# Patient Record
Sex: MALE | Race: WHITE | NOT HISPANIC OR LATINO | Employment: FULL TIME | URBAN - NONMETROPOLITAN AREA
[De-identification: names, ages, dates, MRNs, and addresses within clinical notes are randomized per-mention and may not be internally consistent; named-entity substitution may affect disease eponyms.]

---

## 2023-02-20 PROBLEM — E66.01 MORBID OBESITY WITH BMI OF 40.0-44.9, ADULT (MULTI): Status: ACTIVE | Noted: 2023-02-20

## 2023-02-20 PROBLEM — G44.86 CERVICOGENIC HEADACHE: Status: ACTIVE | Noted: 2023-02-20

## 2023-02-20 PROBLEM — E87.1 CHRONIC HYPONATREMIA: Status: ACTIVE | Noted: 2023-02-20

## 2023-02-20 PROBLEM — M19.041 OSTEOARTHRITIS OF FINGER OF RIGHT HAND: Status: ACTIVE | Noted: 2023-02-20

## 2023-02-20 PROBLEM — E11.65 TYPE 2 DIABETES MELLITUS WITH HYPERGLYCEMIA, WITHOUT LONG-TERM CURRENT USE OF INSULIN (MULTI): Status: ACTIVE | Noted: 2023-02-20

## 2023-02-20 PROBLEM — M16.12 PRIMARY OSTEOARTHRITIS OF LEFT HIP: Status: ACTIVE | Noted: 2023-02-20

## 2023-02-20 PROBLEM — G47.30 SLEEP APNEA: Status: ACTIVE | Noted: 2023-02-20

## 2023-02-20 PROBLEM — J30.2 SEASONAL ALLERGIES: Status: ACTIVE | Noted: 2023-02-20

## 2023-02-20 PROBLEM — I89.0 LYMPHEDEMA: Status: ACTIVE | Noted: 2023-02-20

## 2023-02-20 PROBLEM — E78.00 HYPERCHOLESTEROLEMIA: Status: ACTIVE | Noted: 2023-02-20

## 2023-02-20 PROBLEM — E11.9 TYPE 2 DIABETES MELLITUS (MULTI): Status: ACTIVE | Noted: 2023-02-20

## 2023-02-20 PROBLEM — G57.02 PIRIFORMIS SYNDROME OF LEFT SIDE: Status: ACTIVE | Noted: 2023-02-20

## 2023-02-20 PROBLEM — I10 HYPERTENSION: Status: ACTIVE | Noted: 2023-02-20

## 2023-02-20 PROBLEM — J31.0 SEVERE CHRONIC RHINITIS: Status: ACTIVE | Noted: 2023-02-20

## 2023-02-20 PROBLEM — R91.1 LUNG NODULE, SOLITARY: Status: ACTIVE | Noted: 2023-02-20

## 2023-02-20 PROBLEM — D17.0 LIPOMA OF SCALP: Status: ACTIVE | Noted: 2023-02-20

## 2023-02-20 PROBLEM — D23.5: Status: ACTIVE | Noted: 2023-02-20

## 2023-02-20 PROBLEM — R13.10 DYSPHAGIA: Status: ACTIVE | Noted: 2023-02-20

## 2023-02-20 PROBLEM — N52.9 IMPOTENCE OF ORGANIC ORIGIN: Status: ACTIVE | Noted: 2023-02-20

## 2023-02-20 PROBLEM — G54.0 VASCULAR THORACIC OUTLET SYNDROME: Status: ACTIVE | Noted: 2023-02-20

## 2023-02-20 RX ORDER — METFORMIN HYDROCHLORIDE 500 MG/1
2 TABLET, EXTENDED RELEASE ORAL 2 TIMES DAILY
COMMUNITY
Start: 2019-12-04

## 2023-02-20 RX ORDER — NALOXONE HYDROCHLORIDE 4 MG/.1ML
SPRAY NASAL AS NEEDED
COMMUNITY
Start: 2020-07-16

## 2023-02-20 RX ORDER — IBUPROFEN 200 MG
CAPSULE ORAL 3 TIMES DAILY
COMMUNITY

## 2023-02-20 RX ORDER — FAMOTIDINE 20 MG/1
1 TABLET, FILM COATED ORAL 2 TIMES DAILY
COMMUNITY
End: 2023-03-24 | Stop reason: ALTCHOICE

## 2023-02-20 RX ORDER — METOPROLOL SUCCINATE 100 MG/1
1 TABLET, EXTENDED RELEASE ORAL
COMMUNITY
End: 2023-10-04

## 2023-02-20 RX ORDER — LANCETS
EACH MISCELLANEOUS 3 TIMES DAILY
COMMUNITY

## 2023-02-20 RX ORDER — SILDENAFIL 100 MG/1
1 TABLET, FILM COATED ORAL DAILY PRN
COMMUNITY
End: 2024-06-03 | Stop reason: SDUPTHER

## 2023-02-20 RX ORDER — LOSARTAN POTASSIUM 50 MG/1
1 TABLET ORAL DAILY
COMMUNITY
Start: 2022-10-26 | End: 2023-03-24 | Stop reason: ALTCHOICE

## 2023-02-20 RX ORDER — SEMAGLUTIDE 1.34 MG/ML
1 INJECTION, SOLUTION SUBCUTANEOUS
COMMUNITY
Start: 2022-10-07 | End: 2023-03-24 | Stop reason: ALTCHOICE

## 2023-02-20 RX ORDER — HYDROCODONE BITARTRATE AND ACETAMINOPHEN 10; 325 MG/1; MG/1
1 TABLET ORAL EVERY 6 HOURS
COMMUNITY
End: 2023-03-16 | Stop reason: SDUPTHER

## 2023-02-20 RX ORDER — ISOPROPYL ALCOHOL 70 ML/100ML
SWAB TOPICAL
COMMUNITY

## 2023-03-16 DIAGNOSIS — G44.86 CERVICOGENIC HEADACHE: ICD-10-CM

## 2023-03-16 RX ORDER — HYDROCODONE BITARTRATE AND ACETAMINOPHEN 10; 325 MG/1; MG/1
1 TABLET ORAL EVERY 6 HOURS
Qty: 120 TABLET | Refills: 0 | Status: SHIPPED | OUTPATIENT
Start: 2023-03-18 | End: 2023-04-14 | Stop reason: SDUPTHER

## 2023-03-17 ENCOUNTER — APPOINTMENT (OUTPATIENT)
Dept: PRIMARY CARE | Facility: CLINIC | Age: 58
End: 2023-03-17
Payer: COMMERCIAL

## 2023-03-24 ENCOUNTER — OFFICE VISIT (OUTPATIENT)
Dept: PRIMARY CARE | Facility: CLINIC | Age: 58
End: 2023-03-24
Payer: COMMERCIAL

## 2023-03-24 VITALS
HEIGHT: 74 IN | BODY MASS INDEX: 40.43 KG/M2 | HEART RATE: 89 BPM | SYSTOLIC BLOOD PRESSURE: 130 MMHG | DIASTOLIC BLOOD PRESSURE: 88 MMHG | WEIGHT: 315 LBS | OXYGEN SATURATION: 95 %

## 2023-03-24 DIAGNOSIS — Z79.4 TYPE 2 DIABETES MELLITUS WITH OTHER SPECIFIED COMPLICATION, WITH LONG-TERM CURRENT USE OF INSULIN (MULTI): ICD-10-CM

## 2023-03-24 DIAGNOSIS — E11.69 TYPE 2 DIABETES MELLITUS WITH OTHER SPECIFIED COMPLICATION, WITH LONG-TERM CURRENT USE OF INSULIN (MULTI): ICD-10-CM

## 2023-03-24 DIAGNOSIS — E66.01 MORBID OBESITY WITH BMI OF 40.0-44.9, ADULT (MULTI): ICD-10-CM

## 2023-03-24 DIAGNOSIS — R91.1 LUNG NODULE, SOLITARY: ICD-10-CM

## 2023-03-24 DIAGNOSIS — G47.33 OBSTRUCTIVE SLEEP APNEA SYNDROME: ICD-10-CM

## 2023-03-24 DIAGNOSIS — E11.65 TYPE 2 DIABETES MELLITUS WITH HYPERGLYCEMIA, WITHOUT LONG-TERM CURRENT USE OF INSULIN (MULTI): Primary | ICD-10-CM

## 2023-03-24 DIAGNOSIS — E78.00 HYPERCHOLESTEROLEMIA: ICD-10-CM

## 2023-03-24 DIAGNOSIS — G44.86 CERVICOGENIC HEADACHE: ICD-10-CM

## 2023-03-24 DIAGNOSIS — R13.10 DYSPHAGIA, UNSPECIFIED TYPE: ICD-10-CM

## 2023-03-24 DIAGNOSIS — I10 PRIMARY HYPERTENSION: ICD-10-CM

## 2023-03-24 PROBLEM — E87.1 CHRONIC HYPONATREMIA: Status: RESOLVED | Noted: 2023-02-20 | Resolved: 2023-03-24

## 2023-03-24 PROBLEM — J30.2 SEASONAL ALLERGIES: Status: RESOLVED | Noted: 2023-02-20 | Resolved: 2023-03-24

## 2023-03-24 PROBLEM — I89.0 LYMPHEDEMA: Status: RESOLVED | Noted: 2023-02-20 | Resolved: 2023-03-24

## 2023-03-24 PROCEDURE — 1036F TOBACCO NON-USER: CPT | Performed by: FAMILY MEDICINE

## 2023-03-24 PROCEDURE — 99214 OFFICE O/P EST MOD 30 MIN: CPT | Performed by: FAMILY MEDICINE

## 2023-03-24 PROCEDURE — 3075F SYST BP GE 130 - 139MM HG: CPT | Performed by: FAMILY MEDICINE

## 2023-03-24 PROCEDURE — 3079F DIAST BP 80-89 MM HG: CPT | Performed by: FAMILY MEDICINE

## 2023-03-24 PROCEDURE — 3008F BODY MASS INDEX DOCD: CPT | Performed by: FAMILY MEDICINE

## 2023-03-24 RX ORDER — BLOOD SUGAR DIAGNOSTIC
1 STRIP MISCELLANEOUS 2 TIMES DAILY
Qty: 180 EACH | Refills: 3 | Status: SHIPPED | OUTPATIENT
Start: 2023-03-24 | End: 2024-03-23

## 2023-03-24 ASSESSMENT — PATIENT HEALTH QUESTIONNAIRE - PHQ9
SUM OF ALL RESPONSES TO PHQ9 QUESTIONS 1 AND 2: 0
1. LITTLE INTEREST OR PLEASURE IN DOING THINGS: NOT AT ALL
2. FEELING DOWN, DEPRESSED OR HOPELESS: NOT AT ALL

## 2023-03-24 NOTE — PROGRESS NOTES
"Subjective   Patient ID: Vince Castillo is a 57 y.o. male who presents for Follow-up (Follow up on blood sugars, had covid last week).    HPI   Covid a week ago. Diarrhea and then URI but no chest congestion. Went to [facility]. Was put on Zpak. And fine now. Still some PND and mucus     Has a lung nodule and had a PET for a couple of nodules.  Mild uptake in one of them and in a small pleural effusion.  Dr Clark is following and will do another CT in 3 months and then 6 months for size.     Diabetes has been on Ozempic but could not get that Now on Rybelsus 14.  Ozempic is now back and up to 1 mg.  This has only been for a week.  The blood sugar is in the upper 100s.  Was over 300 and occasionally in mid 100s. All that started with an infection.  Was on Humilin after in hospital but not taking.  Will up the Ozempic 2 mg and then we will check A1C in 3 months.  No Chest pain, Dyspnea, palpitations, numbness, weakness, claudications, or double vision/ loss of vision.  No low sugars.  Was in hospital in December with Pneumonia and gets a gasp.  Weight is up some.. BMI 42    Chronic neck pain as before and in shoulder and hip  Pain 5-9 with drop of 2 with meds.,  3-4 hours.  Able to sleep with this and do his work. Would like to go on long walks. Has had PT and injections in shoulder but not neck.  Has not been to pain management.  Will address next visit when sugars controlled hopefully    Sleep apnea - cannot tolerate CPAP     HTN doing well on current meds     Dysphagia has controlled with diet changes     UDS 7/17/20 - as expected for medication profile 9/8/21 as expected for medication profile 10/7/22 as expected for medication profile         CSA 10/7/22  ORT 4/24/20 score 0  PSA 7/1/22            Review of Systems    Objective   /88 (BP Location: Left arm, Patient Position: Sitting)   Pulse 89   Ht 1.873 m (6' 1.75\")   Wt (!) 151 kg (332 lb)   SpO2 95%   BMI 42.92 kg/m²     Physical Exam  Vitals " reviewed.   Constitutional:       General: He is not in acute distress.     Appearance: Normal appearance. He is obese.   HENT:      Head: Normocephalic.      Right Ear: Tympanic membrane normal.      Left Ear: Tympanic membrane normal.      Nose: Nose normal.      Mouth/Throat:      Pharynx: Oropharynx is clear. No oropharyngeal exudate or posterior oropharyngeal erythema.      Comments: No uvula   Eyes:      Extraocular Movements: Extraocular movements intact.      Conjunctiva/sclera: Conjunctivae normal.      Pupils: Pupils are equal, round, and reactive to light.   Neck:      Vascular: No carotid bruit.   Cardiovascular:      Rate and Rhythm: Normal rate and regular rhythm.      Pulses:           Dorsalis pedis pulses are 3+ on the right side and 3+ on the left side.      Heart sounds: Normal heart sounds. No murmur heard.  Pulmonary:      Effort: Pulmonary effort is normal. No respiratory distress.      Breath sounds: Normal breath sounds.   Abdominal:      General: Abdomen is flat. Bowel sounds are normal. There is no distension.      Palpations: Abdomen is soft. There is no mass.      Tenderness: There is no abdominal tenderness.   Musculoskeletal:      Cervical back: Neck supple. Tenderness present. Decreased range of motion (with rotation , tip and extension).   Lymphadenopathy:      Cervical: No cervical adenopathy.   Skin:     General: Skin is warm and dry.      Findings: No rash.   Neurological:      General: No focal deficit present.      Mental Status: He is alert and oriented to person, place, and time.   Psychiatric:         Mood and Affect: Mood normal.         Thought Content: Thought content normal.         Judgment: Judgment normal.         Assessment/Plan   Problem List Items Addressed This Visit       Cervicogenic headache    Dysphagia    Hypercholesterolemia    Hypertension    Lung nodule, solitary    Morbid obesity with BMI of 40.0-44.9, adult (CMS/[facility])    Sleep apnea    Type 2 diabetes  "mellitus with hyperglycemia, without long-term current use of insulin (CMS/[facility]) - Primary    Relevant Medications    pen needle, diabetic ([employer]ips) 32 gauge x 1/4\" needle    semaglutide 2 mg/dose (8 mg/3 mL) pen injector    Other Relevant Orders    Comprehensive Metabolic Panel    Hemoglobin A1C    Lipid Panel    Albumin , Urine Random    Vitamin B12    Follow Up In Primary Care          "

## 2023-04-14 DIAGNOSIS — G44.86 CERVICOGENIC HEADACHE: ICD-10-CM

## 2023-04-14 RX ORDER — HYDROCODONE BITARTRATE AND ACETAMINOPHEN 10; 325 MG/1; MG/1
1 TABLET ORAL EVERY 6 HOURS
Qty: 120 TABLET | Refills: 0 | Status: SHIPPED | OUTPATIENT
Start: 2023-04-14 | End: 2023-05-12 | Stop reason: SDUPTHER

## 2023-05-12 DIAGNOSIS — G44.86 CERVICOGENIC HEADACHE: ICD-10-CM

## 2023-05-12 RX ORDER — HYDROCODONE BITARTRATE AND ACETAMINOPHEN 10; 325 MG/1; MG/1
1 TABLET ORAL EVERY 6 HOURS
Qty: 120 TABLET | Refills: 0 | Status: SHIPPED | OUTPATIENT
Start: 2023-05-12 | End: 2023-06-09 | Stop reason: SDUPTHER

## 2023-06-09 DIAGNOSIS — G44.86 CERVICOGENIC HEADACHE: ICD-10-CM

## 2023-06-09 RX ORDER — HYDROCODONE BITARTRATE AND ACETAMINOPHEN 10; 325 MG/1; MG/1
1 TABLET ORAL EVERY 6 HOURS
Qty: 120 TABLET | Refills: 0 | Status: SHIPPED | OUTPATIENT
Start: 2023-06-09 | End: 2023-07-07 | Stop reason: SDUPTHER

## 2023-06-21 PROBLEM — M19.011 ARTHRITIS OF BOTH ACROMIOCLAVICULAR JOINTS: Status: ACTIVE | Noted: 2021-09-20

## 2023-06-21 PROBLEM — J90 PLEURAL EFFUSION: Status: ACTIVE | Noted: 2022-12-07

## 2023-06-21 PROBLEM — M19.012 ARTHRITIS OF BOTH ACROMIOCLAVICULAR JOINTS: Status: ACTIVE | Noted: 2021-09-20

## 2023-06-27 DIAGNOSIS — E11.65 TYPE 2 DIABETES MELLITUS WITH HYPERGLYCEMIA, WITHOUT LONG-TERM CURRENT USE OF INSULIN (MULTI): Primary | ICD-10-CM

## 2023-06-29 RX ORDER — HYDROCHLOROTHIAZIDE 12.5 MG/1
1 CAPSULE ORAL DAILY
COMMUNITY
Start: 2022-11-30 | End: 2023-06-30 | Stop reason: ALTCHOICE

## 2023-06-29 RX ORDER — MELOXICAM 15 MG/1
1 TABLET ORAL DAILY
COMMUNITY
Start: 2022-09-17 | End: 2023-10-06 | Stop reason: ALTCHOICE

## 2023-06-29 RX ORDER — CHLORTHALIDONE 25 MG/1
25 TABLET ORAL DAILY
COMMUNITY
Start: 2023-06-22 | End: 2023-08-14 | Stop reason: ALTCHOICE

## 2023-06-29 RX ORDER — AMLODIPINE BESYLATE 5 MG/1
5 TABLET ORAL DAILY
COMMUNITY
Start: 2023-06-22 | End: 2024-04-05 | Stop reason: SDUPTHER

## 2023-06-30 ENCOUNTER — OFFICE VISIT (OUTPATIENT)
Dept: PRIMARY CARE | Facility: CLINIC | Age: 58
End: 2023-06-30
Payer: COMMERCIAL

## 2023-06-30 VITALS
OXYGEN SATURATION: 96 % | HEART RATE: 102 BPM | WEIGHT: 315 LBS | DIASTOLIC BLOOD PRESSURE: 98 MMHG | BODY MASS INDEX: 43.17 KG/M2 | SYSTOLIC BLOOD PRESSURE: 140 MMHG

## 2023-06-30 DIAGNOSIS — E78.00 HYPERCHOLESTEROLEMIA: ICD-10-CM

## 2023-06-30 DIAGNOSIS — J31.0 SEVERE CHRONIC RHINITIS: ICD-10-CM

## 2023-06-30 DIAGNOSIS — G47.33 OBSTRUCTIVE SLEEP APNEA SYNDROME: ICD-10-CM

## 2023-06-30 DIAGNOSIS — E11.65 TYPE 2 DIABETES MELLITUS WITH HYPERGLYCEMIA, WITHOUT LONG-TERM CURRENT USE OF INSULIN (MULTI): ICD-10-CM

## 2023-06-30 DIAGNOSIS — R13.10 DYSPHAGIA, UNSPECIFIED TYPE: ICD-10-CM

## 2023-06-30 DIAGNOSIS — E66.01 MORBID OBESITY WITH BMI OF 40.0-44.9, ADULT (MULTI): ICD-10-CM

## 2023-06-30 DIAGNOSIS — I10 PRIMARY HYPERTENSION: ICD-10-CM

## 2023-06-30 DIAGNOSIS — R91.1 LUNG NODULE, SOLITARY: ICD-10-CM

## 2023-06-30 DIAGNOSIS — M16.12 PRIMARY OSTEOARTHRITIS OF LEFT HIP: ICD-10-CM

## 2023-06-30 DIAGNOSIS — G44.86 CERVICOGENIC HEADACHE: Primary | ICD-10-CM

## 2023-06-30 DIAGNOSIS — D17.0 LIPOMA OF SCALP: ICD-10-CM

## 2023-06-30 PROBLEM — M19.041 OSTEOARTHRITIS OF FINGER OF RIGHT HAND: Status: RESOLVED | Noted: 2023-02-20 | Resolved: 2023-06-30

## 2023-06-30 PROBLEM — G57.02 PIRIFORMIS SYNDROME OF LEFT SIDE: Status: RESOLVED | Noted: 2023-02-20 | Resolved: 2023-06-30

## 2023-06-30 PROBLEM — J90 PLEURAL EFFUSION: Status: RESOLVED | Noted: 2022-12-07 | Resolved: 2023-06-30

## 2023-06-30 PROCEDURE — 3008F BODY MASS INDEX DOCD: CPT | Performed by: FAMILY MEDICINE

## 2023-06-30 PROCEDURE — 1036F TOBACCO NON-USER: CPT | Performed by: FAMILY MEDICINE

## 2023-06-30 PROCEDURE — 3077F SYST BP >= 140 MM HG: CPT | Performed by: FAMILY MEDICINE

## 2023-06-30 PROCEDURE — 99214 OFFICE O/P EST MOD 30 MIN: CPT | Performed by: FAMILY MEDICINE

## 2023-06-30 PROCEDURE — 3080F DIAST BP >= 90 MM HG: CPT | Performed by: FAMILY MEDICINE

## 2023-06-30 NOTE — PROGRESS NOTES
Subjective   Patient ID: Vince Castillo is a 57 y.o. male who presents for Med Management.    HPI  Has a lung nodule and had a PET for a couple of nodules.  Mild uptake in one of them and in a small pleural effusion.  Dr Clark is following and will do another in a year. The nodule did not show on the CT he just had .     Chronic Rhinitis - some yers gets seasonal shot. Quit smoking and thinks may have helped     Lipoma not changed on scalp      Diabetes had been on Ozempic but could not get that. Then back on Rybelsus 14.  And then back on Ozempic  and now to try Trulicity since covered by insurance.  The blood sugar is in the upper 100s. Into 200s.   Will check A1C in 3 months.  No Chest pain, Dyspnea, palpitations, numbness, weakness, claudications, or double vision/ loss of vision.  No low sugars.  Does gasp at times.  Weight is up some.. BMI 43     Chronic neck pain as before and in shoulder and hip  Pain 5-9 with drop of 4 with meds.,  3-4 hours.  Able to sleep with this and do his work. Would like to go on long walks. Has had PT and injections in shoulder but not neck.  Has not been to pain management.  Will address next visit when sugars controlled hopefully.  The hip also is painful and cannot get treated due to high A1C.      Sleep apnea - cannot tolerate CPAP      HTN doing well on current meds. Dr Meyers started Amlodipine and did started tapering Metoprolol. But BP up today.  As is heart rate.   Has been more alert off of the Metoprolol ER.  Chlorthalidone as well.  Needs to go back on half pill for now.  And if 130s/80s for week can go to every other day.  Drop off in a month BP and Pulse      Dysphagia has controlled with diet changes     Hyperlipidemia - will check next time     UDS 7/17/20 - as expected for medication profile 9/8/21 as expected for medication profile 10/7/22 as expected for medication profile         CSA 10/7/22  ORT 4/24/20 score 0  PSA 7/1/22    Review of Systems    Objective   BP  (!) 140/98 (BP Location: Left arm, Patient Position: Sitting)   Pulse 102   Wt (!) 152 kg (334 lb)   SpO2 96%   BMI 43.17 kg/m²     Physical Exam  Vitals reviewed.   Constitutional:       Appearance: Normal appearance.   HENT:      Head: Normocephalic.   Eyes:      Extraocular Movements: Extraocular movements intact.      Conjunctiva/sclera: Conjunctivae normal.      Pupils: Pupils are equal, round, and reactive to light.   Neck:      Vascular: No carotid bruit.   Cardiovascular:      Rate and Rhythm: Normal rate and regular rhythm.      Heart sounds: No murmur heard.  Pulmonary:      Effort: Pulmonary effort is normal.      Breath sounds: Normal breath sounds.   Abdominal:      General: Abdomen is flat. Bowel sounds are normal.      Palpations: Abdomen is soft.   Musculoskeletal:      Cervical back: Normal range of motion. Rigidity (minimal tipping to sides.Good rotation. Tight muscles) present. No tenderness.      Comments: No IR of left hip.    Lymphadenopathy:      Cervical: No cervical adenopathy.   Skin:     General: Skin is warm and dry.   Neurological:      General: No focal deficit present.      Mental Status: He is alert and oriented to person, place, and time.   Psychiatric:         Mood and Affect: Mood normal.         Behavior: Behavior normal.         Thought Content: Thought content normal.         Judgment: Judgment normal.         Assessment/Plan   Problem List Items Addressed This Visit       Cervicogenic headache - Primary    Dysphagia    Hypercholesterolemia    Hypertension    Lipoma of scalp    Lung nodule, solitary    Morbid obesity with BMI of 40.0-44.9, adult (CMS/HCC)    Primary osteoarthritis of left hip    Severe chronic rhinitis    Sleep apnea    Type 2 diabetes mellitus with hyperglycemia, without long-term current use of insulin (CMS/[facility])    Relevant Orders    Follow Up In Primary Care - Established

## 2023-07-07 DIAGNOSIS — G44.86 CERVICOGENIC HEADACHE: ICD-10-CM

## 2023-07-07 RX ORDER — HYDROCODONE BITARTRATE AND ACETAMINOPHEN 10; 325 MG/1; MG/1
1 TABLET ORAL EVERY 6 HOURS
Qty: 120 TABLET | Refills: 0 | Status: SHIPPED | OUTPATIENT
Start: 2023-07-09 | End: 2023-08-07 | Stop reason: SDUPTHER

## 2023-08-07 DIAGNOSIS — G44.86 CERVICOGENIC HEADACHE: ICD-10-CM

## 2023-08-07 RX ORDER — HYDROCODONE BITARTRATE AND ACETAMINOPHEN 10; 325 MG/1; MG/1
1 TABLET ORAL EVERY 6 HOURS
Qty: 120 TABLET | Refills: 0 | Status: SHIPPED | OUTPATIENT
Start: 2023-08-07 | End: 2023-09-05 | Stop reason: SDUPTHER

## 2023-08-14 PROBLEM — R10.9 ABDOMINAL PAIN: Status: ACTIVE | Noted: 2023-08-11

## 2023-08-14 RX ORDER — FENOFIBRATE 160 MG/1
160 TABLET ORAL DAILY
COMMUNITY
Start: 2023-08-13 | End: 2024-01-05 | Stop reason: ALTCHOICE

## 2023-08-14 RX ORDER — INSULIN GLARGINE 100 [IU]/ML
50 INJECTION, SOLUTION SUBCUTANEOUS NIGHTLY
COMMUNITY
Start: 2023-08-12 | End: 2024-01-05 | Stop reason: DRUGHIGH

## 2023-08-14 RX ORDER — INSULIN LISPRO 100 [IU]/ML
30-45 INJECTION, SOLUTION INTRAVENOUS; SUBCUTANEOUS
COMMUNITY
Start: 2023-08-12 | End: 2024-04-05 | Stop reason: SDUPTHER

## 2023-08-16 DIAGNOSIS — G47.33 OBSTRUCTIVE SLEEP APNEA SYNDROME: Primary | ICD-10-CM

## 2023-08-28 DIAGNOSIS — G47.33 OBSTRUCTIVE SLEEP APNEA SYNDROME: ICD-10-CM

## 2023-09-05 DIAGNOSIS — G47.33 OBSTRUCTIVE SLEEP APNEA SYNDROME: Primary | ICD-10-CM

## 2023-09-05 DIAGNOSIS — G44.86 CERVICOGENIC HEADACHE: ICD-10-CM

## 2023-09-05 RX ORDER — HYDROCODONE BITARTRATE AND ACETAMINOPHEN 10; 325 MG/1; MG/1
1 TABLET ORAL EVERY 6 HOURS
Qty: 120 TABLET | Refills: 0 | Status: SHIPPED | OUTPATIENT
Start: 2023-09-05 | End: 2023-09-05 | Stop reason: SDUPTHER

## 2023-09-05 RX ORDER — HYDROCODONE BITARTRATE AND ACETAMINOPHEN 10; 325 MG/1; MG/1
1 TABLET ORAL EVERY 6 HOURS
Qty: 120 TABLET | Refills: 0 | Status: SHIPPED | OUTPATIENT
Start: 2023-09-05 | End: 2023-10-04 | Stop reason: SDUPTHER

## 2023-09-19 DIAGNOSIS — G47.33 OBSTRUCTIVE SLEEP APNEA SYNDROME: Primary | ICD-10-CM

## 2023-10-04 ENCOUNTER — OFFICE VISIT (OUTPATIENT)
Dept: PRIMARY CARE | Facility: CLINIC | Age: 58
End: 2023-10-04
Payer: COMMERCIAL

## 2023-10-04 VITALS
SYSTOLIC BLOOD PRESSURE: 150 MMHG | BODY MASS INDEX: 40.43 KG/M2 | HEIGHT: 74 IN | WEIGHT: 315 LBS | HEART RATE: 97 BPM | OXYGEN SATURATION: 97 % | DIASTOLIC BLOOD PRESSURE: 84 MMHG

## 2023-10-04 DIAGNOSIS — G47.33 OBSTRUCTIVE SLEEP APNEA SYNDROME: ICD-10-CM

## 2023-10-04 DIAGNOSIS — G44.86 CERVICOGENIC HEADACHE: ICD-10-CM

## 2023-10-04 PROCEDURE — 3008F BODY MASS INDEX DOCD: CPT | Performed by: STUDENT IN AN ORGANIZED HEALTH CARE EDUCATION/TRAINING PROGRAM

## 2023-10-04 PROCEDURE — 1036F TOBACCO NON-USER: CPT | Performed by: STUDENT IN AN ORGANIZED HEALTH CARE EDUCATION/TRAINING PROGRAM

## 2023-10-04 PROCEDURE — 3077F SYST BP >= 140 MM HG: CPT | Performed by: STUDENT IN AN ORGANIZED HEALTH CARE EDUCATION/TRAINING PROGRAM

## 2023-10-04 PROCEDURE — 3079F DIAST BP 80-89 MM HG: CPT | Performed by: STUDENT IN AN ORGANIZED HEALTH CARE EDUCATION/TRAINING PROGRAM

## 2023-10-04 PROCEDURE — 99214 OFFICE O/P EST MOD 30 MIN: CPT | Performed by: STUDENT IN AN ORGANIZED HEALTH CARE EDUCATION/TRAINING PROGRAM

## 2023-10-04 RX ORDER — SPIRONOLACTONE 25 MG/1
25 TABLET ORAL
COMMUNITY
Start: 2023-09-07 | End: 2023-10-06 | Stop reason: SDUPTHER

## 2023-10-04 RX ORDER — HYDROCODONE BITARTRATE AND ACETAMINOPHEN 10; 325 MG/1; MG/1
1 TABLET ORAL EVERY 6 HOURS
Qty: 120 TABLET | Refills: 0 | Status: SHIPPED | OUTPATIENT
Start: 2023-10-04 | End: 2023-11-01 | Stop reason: SDUPTHER

## 2023-10-04 NOTE — PROGRESS NOTES
"Sleep Medicine Consultation Note    Chief Complaint   Patient presents with    pt here to f/u on sleep study results      HPI: Mr. Castillo seen at the request of Dr. Hernandez for advice regarding suspected obstructive sleep apnea.     Patient reports that he was diagnosed with obstructive sleep apnea many years ago.  Has tried CPAP for a few days and could not tolerate it.  So he stopped using CPAP.  Lately he has been trying to get into better health.  His diabetes under poor control and his blood pressure is under poor control as well.  He would like to control sleep apnea to see if this helps his overall health.  Reports that he underwent a home sleep study which showed severe sleep apnea and eventually had an in lab sleep study for CPAP titration.  He was recommended to come to us to discuss the results given the complex nature of the results.    Snoring: yes   Severity: loud  Frequency: nightly    Daytime Symptoms:  Upon Awakening: Tired  Daytime fatigue/sleepiness: He has      ROS:  CON: weight change: see HPI  ENT: nasal obstruction: see HPI      PE:  General: calm and cooperative  Eyes: Conjunctival injection: no  EOM: intact  Eyelid hooding: no    Neuro: no tics or tremors noted. Gait stable  Psych  Alert and appropriate: yes  Mood: \"good\"  Affect: mood congruent    Assessment: Mr. Castillo who is seen for management of obstructive sleep apnea. The pathophysiology of, the reasons to treat and treatment options for obstructive sleep apnea were all reviewed with the patient today.  Reviewed his 2 sleep studies.  Home sleep study shows severe degree of KEL along with hypoxemia for over 90 minutes.  Titration study was started at 5 cm of CPAP pressure.  After reaching 15 cmH2O, PAP was transitioned to BiPAP.   After reviewing his study there is decent control of KEL without central sleep apnea and intolerance at pressure 16/12 cmH2O. REM stage of sleep was not observed at this pressure site is possible that there " could be inadequate control. Will start BiPAP with this pressure for now.  Recommended him to sleep on side. Discussed interventions to keep himself on his side when sleeping.  If his download information shows inadequate control of obstructive sleep apnea then will consider another in lab titration study.     Home care company is requested: [facility]    Recommendations:  1) Start BiPAP at 16/12 cmH2O.   2) Driving safety was reviewed with patient. If the patient feels too sleepy to drive he/she knows not to drive. If he/she becomes sleepy while driving he/she will pull over and nap.    The patient indicates understanding of these issues and agrees with the plan.    Follow up in 6-8 weeks.     Individually reviewed both HSAT and PAP titration study results.   More than 30 minutes was spent with patient to discuss results and plan. 5 minutes was spent in writing this note.

## 2023-10-05 RX ORDER — BLOOD-GLUCOSE METER
EACH MISCELLANEOUS
COMMUNITY
Start: 2023-08-13

## 2023-10-05 RX ORDER — FENOFIBRATE 145 MG/1
145 TABLET, FILM COATED ORAL DAILY
COMMUNITY
Start: 2023-09-14 | End: 2024-01-05 | Stop reason: ALTCHOICE

## 2023-10-06 ENCOUNTER — OFFICE VISIT (OUTPATIENT)
Dept: PRIMARY CARE | Facility: CLINIC | Age: 58
End: 2023-10-06
Payer: COMMERCIAL

## 2023-10-06 VITALS
HEART RATE: 92 BPM | OXYGEN SATURATION: 97 % | WEIGHT: 315 LBS | BODY MASS INDEX: 44.6 KG/M2 | DIASTOLIC BLOOD PRESSURE: 88 MMHG | SYSTOLIC BLOOD PRESSURE: 140 MMHG

## 2023-10-06 DIAGNOSIS — M16.12 PRIMARY OSTEOARTHRITIS OF LEFT HIP: ICD-10-CM

## 2023-10-06 DIAGNOSIS — D17.0 LIPOMA OF SCALP: ICD-10-CM

## 2023-10-06 DIAGNOSIS — E66.01 MORBID OBESITY WITH BMI OF 40.0-44.9, ADULT (MULTI): ICD-10-CM

## 2023-10-06 DIAGNOSIS — Z79.899 MEDICATION MANAGEMENT: Primary | ICD-10-CM

## 2023-10-06 DIAGNOSIS — G47.31 PRIMARY CENTRAL SLEEP APNEA: ICD-10-CM

## 2023-10-06 DIAGNOSIS — G44.86 CERVICOGENIC HEADACHE: ICD-10-CM

## 2023-10-06 DIAGNOSIS — E78.1 HYPERTRIGLYCERIDEMIA, FAMILIAL: ICD-10-CM

## 2023-10-06 DIAGNOSIS — R91.1 LUNG NODULE, SOLITARY: ICD-10-CM

## 2023-10-06 DIAGNOSIS — R13.10 DYSPHAGIA, UNSPECIFIED TYPE: ICD-10-CM

## 2023-10-06 DIAGNOSIS — E11.65 TYPE 2 DIABETES MELLITUS WITH HYPERGLYCEMIA, WITHOUT LONG-TERM CURRENT USE OF INSULIN (MULTI): ICD-10-CM

## 2023-10-06 DIAGNOSIS — J31.0 SEVERE CHRONIC RHINITIS: ICD-10-CM

## 2023-10-06 DIAGNOSIS — E78.00 HYPERCHOLESTEROLEMIA: ICD-10-CM

## 2023-10-06 DIAGNOSIS — I10 PRIMARY HYPERTENSION: ICD-10-CM

## 2023-10-06 PROBLEM — R10.9 ABDOMINAL PAIN: Status: RESOLVED | Noted: 2023-08-11 | Resolved: 2023-10-06

## 2023-10-06 PROCEDURE — 82570 ASSAY OF URINE CREATININE: CPT

## 2023-10-06 PROCEDURE — 80373 DRUG SCREENING TRAMADOL: CPT

## 2023-10-06 PROCEDURE — 3060F POS MICROALBUMINURIA REV: CPT | Performed by: FAMILY MEDICINE

## 2023-10-06 PROCEDURE — 80354 DRUG SCREENING FENTANYL: CPT

## 2023-10-06 PROCEDURE — 3077F SYST BP >= 140 MM HG: CPT | Performed by: FAMILY MEDICINE

## 2023-10-06 PROCEDURE — 3008F BODY MASS INDEX DOCD: CPT | Performed by: FAMILY MEDICINE

## 2023-10-06 PROCEDURE — 99215 OFFICE O/P EST HI 40 MIN: CPT | Performed by: FAMILY MEDICINE

## 2023-10-06 PROCEDURE — 80365 DRUG SCREENING OXYCODONE: CPT

## 2023-10-06 PROCEDURE — 80361 OPIATES 1 OR MORE: CPT

## 2023-10-06 PROCEDURE — 80358 DRUG SCREENING METHADONE: CPT

## 2023-10-06 PROCEDURE — 3079F DIAST BP 80-89 MM HG: CPT | Performed by: FAMILY MEDICINE

## 2023-10-06 PROCEDURE — 1036F TOBACCO NON-USER: CPT | Performed by: FAMILY MEDICINE

## 2023-10-06 PROCEDURE — 80346 BENZODIAZEPINES1-12: CPT

## 2023-10-06 PROCEDURE — 80307 DRUG TEST PRSMV CHEM ANLYZR: CPT

## 2023-10-06 PROCEDURE — 80368 SEDATIVE HYPNOTICS: CPT

## 2023-10-06 RX ORDER — SPIRONOLACTONE 50 MG/1
50 TABLET, FILM COATED ORAL DAILY
Qty: 90 TABLET | Refills: 3 | Status: SHIPPED | OUTPATIENT
Start: 2023-10-06 | End: 2024-05-02 | Stop reason: SDUPTHER

## 2023-10-06 NOTE — PROGRESS NOTES
Subjective   Patient ID: Vince Castillo is a 58 y.o. male who presents for Med Management (Had elevated blood sugar 2 mos ago,  elevated BP).    HPI   Has a lung nodule and had a PET for a couple of nodules.  Mild uptake in one of them and in a small pleural effusion.  Dr Clark is following and will do another next year.  Smoking  quit a year ago. Does have nodule on CT in June stable from January. Were 7 and 5 mm.      Chronic Rhinitis - some years gets seasonal shot. Quit smoking and thinks may have helped. Occasionally dysphagia. Chewing better and using water.        Lipoma not changed on scalp      Diabetes  got out of control a couple months ago so he went to [place] ER and admitted. Since then has been following with Dr Castle. Now on Insulin SS Humalog and Lantus. Feels a lot better. Could never get Ozempic or Rybelsus or Trulicity.  Had blurry vision that is better now.  Joints are now stiff.  Were swollen for 2 weeks after he was put on Insulin from knee down.  Wearing compression stockings and on spironolactone.  No Chest pain, Dyspnea, palpitations, numbness, weakness, claudications, or double vision/ loss of vision.  Weight is up some.. BMI 44.  A1C was 12.6.      Chronic neck pain as before and in shoulder and hip  Pain 4-10 with drop of 4 with meds.,  4-6 hours.  Able to sleep with this and do his work. Would like to go on long walks. Has had PT and injections in shoulder but not neck.  Has not been to pain management.  Will address next visit when sugars controlled hopefully.  Has been achier since he was on insulin. The hip also is painful and cannot get treated due to high A1C.      Sleep apnea - has been to Dr Alonzo and had in lab titration and to go with BiPap 12/8.  He is to get the machine yet. Has central      HTN doing well on current meds. Dr Meyers started Amlodipine and spironolactone.  For now will make no changes and hopefully can get the weight back down and get this down to 120s.   Fatigue on  Metoprolol ER.  Chlorthalidone was stopped due to polyuria which turns out to be due to the diabetes.  Will increase the spironolactone.     Dysphagia has controlled with diet changes      Hyperlipidemia - Very high TG when in hospital and on fenofibrate. .  On for 4 weeks. Will check in a week         UDS 20 - as expected for medication profile 21 as expected for medication profile 10/7/22 as expected for medication profile 10/6/23 as expected for medication profile         CSA 10/6/23  ORT 20 score 0  PSA 22    Review of Systems    Objective   /88 (BP Location: Right arm, Patient Position: Sitting)   Pulse 92   Wt (!) 156 kg (345 lb)   SpO2 97%   BMI 44.60 kg/m²     Physical Exam  Vitals reviewed.   Constitutional:       General: He is not in acute distress.     Appearance: Normal appearance. He is obese.   HENT:      Head: Normocephalic.      Right Ear: Tympanic membrane normal.      Left Ear: Tympanic membrane normal.      Nose: Nose normal.      Mouth/Throat:      Mouth: Mucous membranes are moist.      Pharynx: Oropharynx is clear.      Comments: No uvula  Eyes:      Extraocular Movements: Extraocular movements intact.      Conjunctiva/sclera: Conjunctivae normal.      Pupils: Pupils are equal, round, and reactive to light.   Neck:      Vascular: No carotid bruit.   Cardiovascular:      Rate and Rhythm: Normal rate and regular rhythm.      Pulses: Normal pulses.      Heart sounds: Normal heart sounds. No murmur heard.  Pulmonary:      Effort: Pulmonary effort is normal. No respiratory distress.      Breath sounds: Normal breath sounds.   Abdominal:      General: Abdomen is flat. Bowel sounds are normal. There is no distension.      Palpations: Abdomen is soft. There is no mass.      Tenderness: There is no abdominal tenderness.   Musculoskeletal:      Cervical back: Normal range of motion and neck supple. No tenderness.      Right lower le+ Pitting Edema  present.      Left lower le+ Pitting Edema present.   Lymphadenopathy:      Cervical: No cervical adenopathy.   Skin:     General: Skin is warm and dry.      Findings: No rash.   Neurological:      General: No focal deficit present.      Mental Status: He is alert and oriented to person, place, and time.   Psychiatric:         Mood and Affect: Mood normal.         Thought Content: Thought content normal.         Judgment: Judgment normal.         Assessment/Plan   Problem List Items Addressed This Visit             ICD-10-CM    Cervicogenic headache G44.86    Dysphagia R13.10    Hypercholesterolemia E78.00    Hypertension I10    Relevant Medications    spironolactone (Aldactone) 50 mg tablet    Lipoma of scalp D17.0    Lung nodule, solitary R91.1    Morbid obesity with BMI of 40.0-44.9, adult (CMS/[facility]) E66.01, Z68.41    Primary osteoarthritis of left hip M16.12    Severe chronic rhinitis J31.0    Sleep apnea G47.30    Type 2 diabetes mellitus with hyperglycemia, without long-term current use of insulin (CMS/HCC) E11.65    Relevant Orders    Basic Metabolic Panel    Follow Up In Primary Care - Established     Other Visit Diagnoses         Codes    Medication management    -  Primary Z79.899    Relevant Orders    Opiate/Opioid/Benzo Prescription Compliance    Hypertriglyceridemia, familial     E78.1    Relevant Orders    Triglycerides

## 2023-10-07 LAB
AMPHETAMINES UR QL SCN: NORMAL
BARBITURATES UR QL SCN: NORMAL
BZE UR QL SCN: NORMAL
CANNABINOIDS UR QL SCN: NORMAL
CREAT UR-MCNC: 86.2 MG/DL (ref 20–370)
PCP UR QL SCN: NORMAL

## 2023-10-19 ENCOUNTER — DOCUMENTATION (OUTPATIENT)
Dept: PRIMARY CARE | Facility: CLINIC | Age: 58
End: 2023-10-19
Payer: COMMERCIAL

## 2023-10-19 LAB
NONINV COLON CA DNA+OCC BLD SCRN STL QL: NEGATIVE
NONINV COLON CA DNA+OCC BLD SCRN STL-IMP: NEGATIVE

## 2023-11-01 DIAGNOSIS — G44.86 CERVICOGENIC HEADACHE: ICD-10-CM

## 2023-11-01 RX ORDER — HYDROCODONE BITARTRATE AND ACETAMINOPHEN 10; 325 MG/1; MG/1
1 TABLET ORAL EVERY 6 HOURS
Qty: 120 TABLET | Refills: 0 | Status: SHIPPED | OUTPATIENT
Start: 2023-11-03 | End: 2023-12-01 | Stop reason: SDUPTHER

## 2023-11-01 NOTE — TELEPHONE ENCOUNTER
Medical Service company called and was asking for the Base line sleep study, the one without treatment faxed over to them fax is [phone].   Call back number is [phone] option [phone]

## 2023-12-01 DIAGNOSIS — G44.86 CERVICOGENIC HEADACHE: ICD-10-CM

## 2023-12-01 RX ORDER — HYDROCODONE BITARTRATE AND ACETAMINOPHEN 10; 325 MG/1; MG/1
1 TABLET ORAL EVERY 6 HOURS
Qty: 120 TABLET | Refills: 0 | Status: SHIPPED | OUTPATIENT
Start: 2023-12-01 | End: 2023-12-30 | Stop reason: SDUPTHER

## 2023-12-05 ENCOUNTER — APPOINTMENT (OUTPATIENT)
Dept: PRIMARY CARE | Facility: CLINIC | Age: 58
End: 2023-12-05
Payer: COMMERCIAL

## 2023-12-30 DIAGNOSIS — G44.86 CERVICOGENIC HEADACHE: ICD-10-CM

## 2023-12-30 RX ORDER — HYDROCODONE BITARTRATE AND ACETAMINOPHEN 10; 325 MG/1; MG/1
1 TABLET ORAL EVERY 6 HOURS
Qty: 120 TABLET | Refills: 0 | Status: SHIPPED | OUTPATIENT
Start: 2023-12-30 | End: 2024-01-05 | Stop reason: SDUPTHER

## 2024-01-02 ENCOUNTER — LAB (OUTPATIENT)
Dept: LAB | Facility: LAB | Age: 59
End: 2024-01-02
Payer: COMMERCIAL

## 2024-01-02 DIAGNOSIS — E78.1 HYPERTRIGLYCERIDEMIA, FAMILIAL: ICD-10-CM

## 2024-01-02 DIAGNOSIS — E11.65 TYPE 2 DIABETES MELLITUS WITH HYPERGLYCEMIA, WITHOUT LONG-TERM CURRENT USE OF INSULIN (MULTI): ICD-10-CM

## 2024-01-02 LAB
ALBUMIN SERPL BCP-MCNC: 4.1 G/DL (ref 3.4–5)
ALP SERPL-CCNC: 69 U/L (ref 33–120)
ALT SERPL W P-5'-P-CCNC: 27 U/L (ref 10–52)
ANION GAP SERPL CALC-SCNC: 10 MMOL/L (ref 10–20)
AST SERPL W P-5'-P-CCNC: 20 U/L (ref 9–39)
BILIRUB SERPL-MCNC: 0.6 MG/DL (ref 0–1.2)
BUN SERPL-MCNC: 17 MG/DL (ref 6–23)
CALCIUM SERPL-MCNC: 9.3 MG/DL (ref 8.6–10.3)
CHLORIDE SERPL-SCNC: 100 MMOL/L (ref 98–107)
CHOLEST SERPL-MCNC: 213 MG/DL (ref 0–199)
CHOLESTEROL/HDL RATIO: 4.3
CO2 SERPL-SCNC: 28 MMOL/L (ref 21–32)
CREAT SERPL-MCNC: 0.76 MG/DL (ref 0.5–1.3)
CREAT UR-MCNC: 64 MG/DL (ref 20–370)
EST. AVERAGE GLUCOSE BLD GHB EST-MCNC: 171 MG/DL
GFR SERPL CREATININE-BSD FRML MDRD: >90 ML/MIN/1.73M*2
GLUCOSE SERPL-MCNC: 227 MG/DL (ref 74–99)
HBA1C MFR BLD: 7.6 %
HDLC SERPL-MCNC: 50 MG/DL
LDLC SERPL CALC-MCNC: 130 MG/DL
MICROALBUMIN UR-MCNC: <7 MG/L
MICROALBUMIN/CREAT UR: NORMAL MG/G{CREAT}
NON HDL CHOLESTEROL: 163 MG/DL (ref 0–149)
POTASSIUM SERPL-SCNC: 4.4 MMOL/L (ref 3.5–5.3)
PROT SERPL-MCNC: 6.9 G/DL (ref 6.4–8.2)
SODIUM SERPL-SCNC: 134 MMOL/L (ref 136–145)
TRIGL SERPL-MCNC: 163 MG/DL (ref 0–149)
VIT B12 SERPL-MCNC: 247 PG/ML (ref 211–911)
VLDL: 33 MG/DL (ref 0–40)

## 2024-01-02 PROCEDURE — 83036 HEMOGLOBIN GLYCOSYLATED A1C: CPT

## 2024-01-02 PROCEDURE — 80053 COMPREHEN METABOLIC PANEL: CPT

## 2024-01-02 PROCEDURE — 36415 COLL VENOUS BLD VENIPUNCTURE: CPT

## 2024-01-02 PROCEDURE — 82043 UR ALBUMIN QUANTITATIVE: CPT

## 2024-01-02 PROCEDURE — 82607 VITAMIN B-12: CPT

## 2024-01-02 PROCEDURE — 80061 LIPID PANEL: CPT

## 2024-01-02 PROCEDURE — 82570 ASSAY OF URINE CREATININE: CPT

## 2024-01-05 ENCOUNTER — OFFICE VISIT (OUTPATIENT)
Dept: PRIMARY CARE | Facility: CLINIC | Age: 59
End: 2024-01-05
Payer: COMMERCIAL

## 2024-01-05 VITALS
HEART RATE: 87 BPM | BODY MASS INDEX: 45.63 KG/M2 | OXYGEN SATURATION: 96 % | SYSTOLIC BLOOD PRESSURE: 138 MMHG | DIASTOLIC BLOOD PRESSURE: 82 MMHG | WEIGHT: 315 LBS

## 2024-01-05 DIAGNOSIS — Z00.00 HEALTHCARE MAINTENANCE: ICD-10-CM

## 2024-01-05 DIAGNOSIS — G47.31 PRIMARY CENTRAL SLEEP APNEA: ICD-10-CM

## 2024-01-05 DIAGNOSIS — E11.65 TYPE 2 DIABETES MELLITUS WITH HYPERGLYCEMIA, WITH LONG-TERM CURRENT USE OF INSULIN (MULTI): ICD-10-CM

## 2024-01-05 DIAGNOSIS — M16.12 PRIMARY OSTEOARTHRITIS OF LEFT HIP: ICD-10-CM

## 2024-01-05 DIAGNOSIS — I10 PRIMARY HYPERTENSION: ICD-10-CM

## 2024-01-05 DIAGNOSIS — Z79.4 TYPE 2 DIABETES MELLITUS WITH HYPERGLYCEMIA, WITH LONG-TERM CURRENT USE OF INSULIN (MULTI): ICD-10-CM

## 2024-01-05 DIAGNOSIS — G44.86 CERVICOGENIC HEADACHE: ICD-10-CM

## 2024-01-05 DIAGNOSIS — E66.01 MORBID OBESITY WITH BMI OF 45.0-49.9, ADULT (MULTI): ICD-10-CM

## 2024-01-05 DIAGNOSIS — R91.1 LUNG NODULE, SOLITARY: ICD-10-CM

## 2024-01-05 DIAGNOSIS — J31.0 SEVERE CHRONIC RHINITIS: Primary | ICD-10-CM

## 2024-01-05 PROBLEM — E11.69 TYPE 2 DIABETES MELLITUS WITH OTHER SPECIFIED COMPLICATION, WITH LONG-TERM CURRENT USE OF INSULIN (MULTI): Status: ACTIVE | Noted: 2023-02-20

## 2024-01-05 PROBLEM — E11.69 TYPE 2 DIABETES MELLITUS WITH OTHER SPECIFIED COMPLICATION, WITH LONG-TERM CURRENT USE OF INSULIN (MULTI): Status: RESOLVED | Noted: 2023-02-20 | Resolved: 2024-01-05

## 2024-01-05 LAB
1OH-MIDAZOLAM UR CFM-MCNC: <25 NG/ML
6MAM UR CFM-MCNC: <25 NG/ML
7AMINOCLONAZEPAM UR CFM-MCNC: <25 NG/ML
A-OH ALPRAZ UR CFM-MCNC: <25 NG/ML
ALPRAZ UR CFM-MCNC: <25 NG/ML
CHLORDIAZEP UR CFM-MCNC: <25 NG/ML
CLONAZEPAM UR CFM-MCNC: <25 NG/ML
CODEINE UR CFM-MCNC: <50 NG/ML
DIAZEPAM UR CFM-MCNC: <25 NG/ML
EDDP UR CFM-MCNC: <25 NG/ML
FENTANYL UR CFM-MCNC: <2.5 NG/ML
HYDROCODONE CTO UR CFM-MCNC: 758 NG/ML
HYDROMORPHONE UR CFM-MCNC: 139 NG/ML
LORAZEPAM UR CFM-MCNC: <25 NG/ML
METHADONE UR CFM-MCNC: <25 NG/ML
MIDAZOLAM UR CFM-MCNC: <25 NG/ML
MORPHINE UR CFM-MCNC: <50 NG/ML
NORDIAZEPAM UR CFM-MCNC: <25 NG/ML
NORFENTANYL UR CFM-MCNC: <2.5 NG/ML
NORHYDROCODONE UR CFM-MCNC: 541 NG/ML
NOROXYCODONE UR CFM-MCNC: <25 NG/ML
NORTRAMADOL UR-MCNC: <50 NG/ML
OXAZEPAM UR CFM-MCNC: <25 NG/ML
OXYCODONE UR CFM-MCNC: <25 NG/ML
OXYMORPHONE UR CFM-MCNC: <25 NG/ML
TEMAZEPAM UR CFM-MCNC: <25 NG/ML
TRAMADOL UR CFM-MCNC: <50 NG/ML
ZOLPIDEM UR CFM-MCNC: <25 NG/ML
ZOLPIDEM UR-MCNC: <25 NG/ML

## 2024-01-05 PROCEDURE — 99214 OFFICE O/P EST MOD 30 MIN: CPT | Performed by: FAMILY MEDICINE

## 2024-01-05 PROCEDURE — 3008F BODY MASS INDEX DOCD: CPT | Performed by: FAMILY MEDICINE

## 2024-01-05 PROCEDURE — 1036F TOBACCO NON-USER: CPT | Performed by: FAMILY MEDICINE

## 2024-01-05 PROCEDURE — 3079F DIAST BP 80-89 MM HG: CPT | Performed by: FAMILY MEDICINE

## 2024-01-05 PROCEDURE — 3050F LDL-C >= 130 MG/DL: CPT | Performed by: FAMILY MEDICINE

## 2024-01-05 PROCEDURE — 3062F POS MACROALBUMINURIA REV: CPT | Performed by: FAMILY MEDICINE

## 2024-01-05 PROCEDURE — 3075F SYST BP GE 130 - 139MM HG: CPT | Performed by: FAMILY MEDICINE

## 2024-01-05 PROCEDURE — 3051F HG A1C>EQUAL 7.0%<8.0%: CPT | Performed by: FAMILY MEDICINE

## 2024-01-05 RX ORDER — BLOOD-GLUCOSE SENSOR
1 EACH MISCELLANEOUS
COMMUNITY
Start: 2023-12-28 | End: 2024-04-05 | Stop reason: SDUPTHER

## 2024-01-05 RX ORDER — INSULIN GLARGINE 100 [IU]/ML
45 INJECTION, SOLUTION SUBCUTANEOUS NIGHTLY
Qty: 3 ML | Refills: 11 | Status: SHIPPED | OUTPATIENT
Start: 2024-01-05

## 2024-01-05 RX ORDER — HYDROCODONE BITARTRATE AND ACETAMINOPHEN 10; 325 MG/1; MG/1
1 TABLET ORAL EVERY 6 HOURS
Qty: 120 TABLET | Refills: 0 | Status: SHIPPED | OUTPATIENT
Start: 2024-01-05 | End: 2024-02-04

## 2024-01-05 RX ORDER — MELOXICAM 15 MG/1
15 TABLET ORAL DAILY
Qty: 30 TABLET | Refills: 11 | Status: SHIPPED | OUTPATIENT
Start: 2024-01-05 | End: 2025-01-04

## 2024-01-05 NOTE — PROGRESS NOTES
Subjective   Patient ID: Vince Castillo is a 58 y.o. male who presents for Med Management (Discuss weight gain and insulin).    HPI   Has a lung nodule and had a PET for a couple of nodules.  Mild uptake in one of them and in a small pleural effusion.  Dr Clark is following and will do another in June.  Smoking  quit a year ago. Does have nodule on CT in June 2023  stable from January. Were 7 and 5 mm.       Chronic Rhinitis - some years gets seasonal shot. Quit smoking and thinks may have helped. Occasionally dysphagia. Chewing better and using water.        Lipoma not changed on scalp      Diabetes  got out of control last year so he went to [place] ER and admitted. Since then has been following with Dr Castle. Now on Insulin SS Humalog and Lantus. Feels a lot better. Could never get Ozempic  or Rybelsus or Trulicity.  Gaining weight.  Had blurry vision that is better now.  Joints are now stiff and swollen in the hands after he was put on Insulin.  Wearing compression stockings and on spironolactone for swelling in legs.  Better in hands with Voltaren.  No Chest pain, Dyspnea, palpitations, numbness, weakness, claudications, or double vision/ loss of vision.  Weight is up again.  BMI 45.  A1C was 7.6 this time down from 12.6.  Will check to see if they can get GLP1 again..       Chronic neck pain as before and in shoulder and hip  Pain 5-10 with drop of 3 with meds.,  3-4 hours.  Seems rougher in Winter.  Able to sleep with this and do his work. Would like to go on long walks. Has had PT and injections in shoulder but not neck.  Has not been to pain management.  Has been achier since he was on insulin. The hip also is painful and now can  get treated with low A1C. Will try Mobic      Sleep apnea - has been to Dr Alonzo and had in lab titration and to go with BiPap 12/8.  He just got  the machine yet. Has central.      HTN doing well on current meds. Dr Meyers started Amlodipine and spironolactone.  For now will make  no changes and hopefully can get the weight back down and get this down to 120s.  Fatigue on Metoprolol ER.  Chlorthalidone was stopped due to polyuria which turns out to be due to the diabetes.       Dysphagia has controlled with diet changes      Hyperlipidemia - Much better TG even thought he stopped the fenofibrate. . .          UDS 7/17/20 - as expected for medication profile 9/8/21 as expected for medication profile 10/7/22 as expected for medication profile 10/6/23 as expected for medication profile         CSA 10/6/23  ORT 4/24/20 score 0  PSA 7/1/22    Review of Systems    Objective   /82 (BP Location: Left arm, Patient Position: Sitting)   Pulse 87   Wt (!) 160 kg (353 lb)   SpO2 96%   BMI 45.63 kg/m²     Physical Exam  Vitals reviewed.   Constitutional:       General: He is not in acute distress.     Appearance: Normal appearance.   HENT:      Head: Normocephalic.      Right Ear: Tympanic membrane, ear canal and external ear normal.      Left Ear: Tympanic membrane, ear canal and external ear normal.      Nose: Nose normal.      Mouth/Throat:      Mouth: Mucous membranes are moist.      Pharynx: Oropharynx is clear.      Comments: No uvula   Eyes:      Extraocular Movements: Extraocular movements intact.      Conjunctiva/sclera: Conjunctivae normal.      Pupils: Pupils are equal, round, and reactive to light.   Neck:      Vascular: No carotid bruit.   Cardiovascular:      Rate and Rhythm: Normal rate and regular rhythm.      Pulses: Normal pulses.      Heart sounds: Normal heart sounds. No murmur heard.  Pulmonary:      Effort: Pulmonary effort is normal. No respiratory distress.      Breath sounds: Normal breath sounds.   Abdominal:      General: Abdomen is flat. Bowel sounds are normal. There is no distension.      Palpations: Abdomen is soft. There is no mass.      Tenderness: There is no abdominal tenderness.   Musculoskeletal:         General: Tenderness (paracervical  paralumbar) present.      Cervical back: Normal range of motion and neck supple. No tenderness.      Comments: No motion of right hip  Limited rotation of neck    Lymphadenopathy:      Cervical: No cervical adenopathy.   Skin:     General: Skin is warm and dry.      Findings: No rash.   Neurological:      General: No focal deficit present.      Mental Status: He is alert and oriented to person, place, and time.   Psychiatric:         Mood and Affect: Mood normal.         Thought Content: Thought content normal.         Judgment: Judgment normal.         Assessment/Plan   Diagnoses and all orders for this visit:  Severe chronic rhinitis  Type 2 diabetes mellitus with hyperglycemia, with long-term current use of insulin (CMS/[facility])  -     Follow Up In Primary Care - Established  -     Comprehensive Metabolic Panel; Future  -     Hemoglobin A1C; Future  -     Follow Up In Primary Care - Established; Future  Cervicogenic headache  -     HYDROcodone-acetaminophen (Norco)  mg tablet; Take 1 tablet by mouth every 6 hours.  -     Follow Up In Primary Care - Established; Future  Lung nodule, solitary  Morbid obesity with BMI of 45.0-49.9, adult (CMS/[facility])  Primary central sleep apnea  Primary osteoarthritis of left hip  -     meloxicam (Mobic) 15 mg tablet; Take 1 tablet (15 mg) by mouth once daily.  Primary hypertension  Healthcare maintenance  -     Prostate Specific Antigen, Screen; Future  Other orders  -     insulin glargine (Lantus) 100 unit/mL (3 mL) pen; Inject 45 Units under the skin once daily at bedtime.

## 2024-01-05 NOTE — PATIENT INSTRUCTIONS
Check to see in [place], Ozempic or Trulicity are now available. Let me know and I will start one of them.  And decrease insulin

## 2024-01-08 DIAGNOSIS — G44.86 CERVICOGENIC HEADACHE: ICD-10-CM

## 2024-01-08 RX ORDER — HYDROCODONE BITARTRATE AND ACETAMINOPHEN 7.5; 325 MG/1; MG/1
1 TABLET ORAL EVERY 4 HOURS PRN
Qty: 180 TABLET | Refills: 0 | Status: SHIPPED | OUTPATIENT
Start: 2024-01-08 | End: 2024-02-05 | Stop reason: SDUPTHER

## 2024-01-12 ENCOUNTER — APPOINTMENT (OUTPATIENT)
Dept: PRIMARY CARE | Facility: CLINIC | Age: 59
End: 2024-01-12
Payer: COMMERCIAL

## 2024-02-05 DIAGNOSIS — G44.86 CERVICOGENIC HEADACHE: ICD-10-CM

## 2024-02-05 RX ORDER — HYDROCODONE BITARTRATE AND ACETAMINOPHEN 7.5; 325 MG/1; MG/1
1 TABLET ORAL EVERY 4 HOURS PRN
Qty: 180 TABLET | Refills: 0 | Status: CANCELLED | OUTPATIENT
Start: 2024-02-05 | End: 2024-03-06

## 2024-02-05 RX ORDER — HYDROCODONE BITARTRATE AND ACETAMINOPHEN 7.5; 325 MG/1; MG/1
1 TABLET ORAL EVERY 4 HOURS PRN
Qty: 180 TABLET | Refills: 0 | Status: SHIPPED | OUTPATIENT
Start: 2024-02-05 | End: 2024-03-04 | Stop reason: SDUPTHER

## 2024-03-04 DIAGNOSIS — G44.86 CERVICOGENIC HEADACHE: ICD-10-CM

## 2024-03-04 RX ORDER — HYDROCODONE BITARTRATE AND ACETAMINOPHEN 7.5; 325 MG/1; MG/1
1 TABLET ORAL EVERY 4 HOURS PRN
Qty: 180 TABLET | Refills: 0 | Status: SHIPPED | OUTPATIENT
Start: 2024-03-04 | End: 2024-04-04 | Stop reason: SDUPTHER

## 2024-04-04 DIAGNOSIS — G44.86 CERVICOGENIC HEADACHE: ICD-10-CM

## 2024-04-04 RX ORDER — HYDROCODONE BITARTRATE AND ACETAMINOPHEN 7.5; 325 MG/1; MG/1
1 TABLET ORAL EVERY 4 HOURS PRN
Qty: 180 TABLET | Refills: 0 | Status: SHIPPED | OUTPATIENT
Start: 2024-04-04 | End: 2024-05-02 | Stop reason: SDUPTHER

## 2024-04-05 ENCOUNTER — OFFICE VISIT (OUTPATIENT)
Dept: PRIMARY CARE | Facility: CLINIC | Age: 59
End: 2024-04-05
Payer: COMMERCIAL

## 2024-04-05 VITALS
OXYGEN SATURATION: 96 % | SYSTOLIC BLOOD PRESSURE: 134 MMHG | BODY MASS INDEX: 40.43 KG/M2 | DIASTOLIC BLOOD PRESSURE: 80 MMHG | HEIGHT: 74 IN | WEIGHT: 315 LBS | HEART RATE: 91 BPM

## 2024-04-05 DIAGNOSIS — E66.01 MORBID OBESITY WITH BMI OF 45.0-49.9, ADULT (MULTI): Primary | ICD-10-CM

## 2024-04-05 DIAGNOSIS — D17.0 LIPOMA OF SCALP: ICD-10-CM

## 2024-04-05 DIAGNOSIS — I10 PRIMARY HYPERTENSION: ICD-10-CM

## 2024-04-05 DIAGNOSIS — Z12.11 COLON CANCER SCREENING: ICD-10-CM

## 2024-04-05 DIAGNOSIS — R91.1 LUNG NODULE, SOLITARY: ICD-10-CM

## 2024-04-05 DIAGNOSIS — G47.31 PRIMARY CENTRAL SLEEP APNEA: ICD-10-CM

## 2024-04-05 DIAGNOSIS — E78.2 MIXED HYPERLIPIDEMIA: ICD-10-CM

## 2024-04-05 DIAGNOSIS — Z79.4 TYPE 2 DIABETES MELLITUS WITH HYPERGLYCEMIA, WITH LONG-TERM CURRENT USE OF INSULIN (MULTI): ICD-10-CM

## 2024-04-05 DIAGNOSIS — J31.0 SEVERE CHRONIC RHINITIS: ICD-10-CM

## 2024-04-05 DIAGNOSIS — G44.86 CERVICOGENIC HEADACHE: ICD-10-CM

## 2024-04-05 DIAGNOSIS — Z00.00 HEALTHCARE MAINTENANCE: ICD-10-CM

## 2024-04-05 DIAGNOSIS — M16.12 PRIMARY OSTEOARTHRITIS OF LEFT HIP: ICD-10-CM

## 2024-04-05 DIAGNOSIS — E11.65 TYPE 2 DIABETES MELLITUS WITH HYPERGLYCEMIA, WITH LONG-TERM CURRENT USE OF INSULIN (MULTI): ICD-10-CM

## 2024-04-05 DIAGNOSIS — R13.10 DYSPHAGIA, UNSPECIFIED TYPE: ICD-10-CM

## 2024-04-05 PROCEDURE — 3051F HG A1C>EQUAL 7.0%<8.0%: CPT | Performed by: FAMILY MEDICINE

## 2024-04-05 PROCEDURE — 3050F LDL-C >= 130 MG/DL: CPT | Performed by: FAMILY MEDICINE

## 2024-04-05 PROCEDURE — 3079F DIAST BP 80-89 MM HG: CPT | Performed by: FAMILY MEDICINE

## 2024-04-05 PROCEDURE — 3075F SYST BP GE 130 - 139MM HG: CPT | Performed by: FAMILY MEDICINE

## 2024-04-05 PROCEDURE — 3062F POS MACROALBUMINURIA REV: CPT | Performed by: FAMILY MEDICINE

## 2024-04-05 PROCEDURE — 1036F TOBACCO NON-USER: CPT | Performed by: FAMILY MEDICINE

## 2024-04-05 PROCEDURE — 99214 OFFICE O/P EST MOD 30 MIN: CPT | Performed by: FAMILY MEDICINE

## 2024-04-05 PROCEDURE — 3008F BODY MASS INDEX DOCD: CPT | Performed by: FAMILY MEDICINE

## 2024-04-05 RX ORDER — DULAGLUTIDE 0.75 MG/.5ML
0.75 INJECTION, SOLUTION SUBCUTANEOUS
Qty: 2 ML | Refills: 11 | Status: SHIPPED | OUTPATIENT
Start: 2024-04-05

## 2024-04-05 RX ORDER — INSULIN LISPRO 100 [IU]/ML
40 INJECTION, SOLUTION INTRAVENOUS; SUBCUTANEOUS
Qty: 45 ML | Refills: 5 | Status: SHIPPED | OUTPATIENT
Start: 2024-04-05

## 2024-04-05 RX ORDER — AMLODIPINE BESYLATE 5 MG/1
5 TABLET ORAL DAILY
Qty: 90 TABLET | Refills: 1 | Status: SHIPPED | OUTPATIENT
Start: 2024-04-05 | End: 2024-06-03 | Stop reason: SDUPTHER

## 2024-04-05 RX ORDER — BLOOD-GLUCOSE SENSOR
1 EACH MISCELLANEOUS
Qty: 6 EACH | Refills: 3 | Status: SHIPPED | OUTPATIENT
Start: 2024-04-05

## 2024-04-05 NOTE — PROGRESS NOTES
Subjective   Patient ID: Vince Castillo is a 58 y.o. male who presents for Med Management.    HPI   Has a lung nodule and had a PET for a couple of nodules.  Mild uptake in one of them and in a small pleural effusion.  Dr Clark is following and will do another in June.  Smoking  quit December 2022.       Chronic Rhinitis - some years gets seasonal shot. Quit smoking and thinks may have helped. Occasionally dysphagia. Chewing better and using water.        Lipoma not changed on scalp      Diabetes  got out of control last year so he went to [place] ER and admitted. Since then has been following with Dr Castle. Now on Insulin SS Humalog and Lantus. Feels a lot better. Could never get Ozempic or Rybelsus or Trulicity.  Insurance says they cover the latter so will try again.  Gaining weight.  Had blurry vision that is better now.  Joints are now stiff and swollen in the hands after he was put on Insulin.  Wearing compression stockings and on spironolactone for swelling in legs.  Better in hands with Voltaren.  No Chest pain, Dyspnea, palpitations, numbness, weakness, claudications, or double vision/ loss of vision.  Weight is up again.  BMI 45.  A1C was 7.6 last  time down from 12.6.  Freestyle says he is 7.7%     Chronic neck pain as before and in shoulder and hip  Pain 5-10 with drop of 3 with meds.,  4-6 hours.  Better with Mobic.  Seems rougher in Winter.  Able to sleep with this and do his work. Would like to go on long walks. Has had PT and injections in shoulder but not neck.  Has not been to pain management.  Has been achier since he was on insulin. The hip also is painful and now can get treated with low A1C.  Mobic      Sleep apnea - has been to Dr Alonzo and had in lab titration and to go with BiPap 12/8.  Feels better.  Has central.      HTN doing well on current meds. Dr Meyers started Amlodipine and spironolactone.  For now will make no changes and hopefully can get the weight back down and get this down to  "120s.  Fatigue on Metoprolol ER.  Chlorthalidone was stopped due to polyuria which turns out to be due to the diabetes.       Dysphagia has controlled with diet changes      Hyperlipidemia - Much better TG even though he stopped the fenofibrate. . .          UDS 7/17/20 - as expected for medication profile 9/8/21 as expected for medication profile 10/7/22 as expected for medication profile 10/6/23 as expected for medication profile         CSA 10/6/23  ORT 4/24/20 score 0  PSA 7/1/22  Cologuard ordered - has [facility] and had scope about 10 years ago.     Review of Systems    Objective   /80 (BP Location: Left arm, Patient Position: Sitting)   Pulse 91   Ht 1.873 m (6' 1.75\")   Wt (!) 166 kg (365 lb)   SpO2 96%   BMI 47.18 kg/m²     Physical Exam  Vitals reviewed.   Constitutional:       General: He is not in acute distress.     Appearance: Normal appearance. He is obese.   HENT:      Head: Normocephalic.      Right Ear: Tympanic membrane, ear canal and external ear normal.      Left Ear: Tympanic membrane, ear canal and external ear normal.      Nose: Nose normal.      Mouth/Throat:      Mouth: Mucous membranes are moist.      Pharynx: Oropharynx is clear.      Comments: No uvula  Eyes:      Extraocular Movements: Extraocular movements intact.      Conjunctiva/sclera: Conjunctivae normal.      Pupils: Pupils are equal, round, and reactive to light.   Neck:      Vascular: No carotid bruit.   Cardiovascular:      Rate and Rhythm: Normal rate and regular rhythm.      Pulses: Normal pulses.      Heart sounds: Normal heart sounds. No murmur heard.  Pulmonary:      Effort: Pulmonary effort is normal. No respiratory distress.      Breath sounds: Normal breath sounds.   Abdominal:      General: Abdomen is flat. Bowel sounds are normal. There is no distension.      Palpations: Abdomen is soft. There is no mass.      Tenderness: There is no abdominal tenderness.   Musculoskeletal:         General: Tenderness " (paracervical) present.      Cervical back: Normal range of motion and neck supple. No tenderness.      Comments: Decreased rotation of neck    Lymphadenopathy:      Cervical: No cervical adenopathy.   Skin:     General: Skin is warm and dry.      Findings: No rash.   Neurological:      General: No focal deficit present.      Mental Status: He is alert and oriented to person, place, and time.   Psychiatric:         Mood and Affect: Mood normal.         Thought Content: Thought content normal.         Judgment: Judgment normal.         Assessment/Plan   Diagnoses and all orders for this visit:  Morbid obesity with BMI of 45.0-49.9, adult (CMS/[facility])  Type 2 diabetes mellitus with hyperglycemia, with long-term current use of insulin (CMS/[facility])  -     Follow Up In Primary Care - Established  -     blood-glucose sensor (FreeStyle Cindy 3 Sensor) device; Apply 1 each topically every 14 (fourteen) days.  -     insulin lispro (HumaLOG) 100 unit/mL injection; Inject 40 Units under the skin 3 times a day with meals.  -     dulaglutide (Trulicity) 0.75 mg/0.5 mL pen injector; Inject 0.75 mg under the skin 1 (one) time per week.  -     Comprehensive Metabolic Panel; Future  -     CBC; Future  -     Hemoglobin A1C; Future  -     Triglycerides; Future  Cervicogenic headache  -     Follow Up In Primary Care - Established  Primary hypertension  -     amLODIPine (Norvasc) 5 mg tablet; Take 1 tablet (5 mg) by mouth once daily.  Healthcare maintenance  -     Prostate Specific Antigen, Screen; Future  Dysphagia, unspecified type  Mixed hyperlipidemia  Lung nodule, solitary  Lipoma of scalp  Severe chronic rhinitis  Primary central sleep apnea  Colon cancer screening  -     Cologuard® colon cancer screening; Future  Primary osteoarthritis of left hip

## 2024-05-02 DIAGNOSIS — G44.86 CERVICOGENIC HEADACHE: ICD-10-CM

## 2024-05-02 DIAGNOSIS — I10 PRIMARY HYPERTENSION: ICD-10-CM

## 2024-05-02 RX ORDER — SPIRONOLACTONE 50 MG/1
50 TABLET, FILM COATED ORAL DAILY
Qty: 90 TABLET | Refills: 3 | Status: SHIPPED | OUTPATIENT
Start: 2024-05-02 | End: 2024-06-03 | Stop reason: SDUPTHER

## 2024-05-02 RX ORDER — HYDROCODONE BITARTRATE AND ACETAMINOPHEN 7.5; 325 MG/1; MG/1
1 TABLET ORAL EVERY 4 HOURS PRN
Qty: 180 TABLET | Refills: 0 | Status: SHIPPED | OUTPATIENT
Start: 2024-05-04 | End: 2024-06-03 | Stop reason: SDUPTHER

## 2024-05-06 LAB — NONINV COLON CA DNA+OCC BLD SCRN STL QL: POSITIVE

## 2024-05-07 DIAGNOSIS — R19.5 POSITIVE COLORECTAL CANCER SCREENING USING COLOGUARD TEST: Primary | ICD-10-CM

## 2024-05-07 NOTE — RESULT ENCOUNTER NOTE
Let the patient know that they have a positive Cologuard.  This means they have a 50% chance of having a polyp that is precancerous.  An 6% chance of having colon cancer. So I have put in an order for a colonoscopy.  See if they have a preference who does that.

## 2024-05-10 DIAGNOSIS — R19.5 POSITIVE COLORECTAL CANCER SCREENING USING COLOGUARD TEST: ICD-10-CM

## 2024-05-13 ENCOUNTER — ANESTHESIA (OUTPATIENT)
Dept: OPERATING ROOM | Facility: HOSPITAL | Age: 59
End: 2024-05-13
Payer: COMMERCIAL

## 2024-05-13 ENCOUNTER — ANESTHESIA EVENT (OUTPATIENT)
Dept: OPERATING ROOM | Facility: HOSPITAL | Age: 59
End: 2024-05-13
Payer: COMMERCIAL

## 2024-05-13 ENCOUNTER — HOSPITAL ENCOUNTER (OUTPATIENT)
Dept: OPERATING ROOM | Facility: HOSPITAL | Age: 59
Setting detail: OUTPATIENT SURGERY
Discharge: HOME | End: 2024-05-13
Payer: COMMERCIAL

## 2024-05-13 VITALS
RESPIRATION RATE: 16 BRPM | DIASTOLIC BLOOD PRESSURE: 72 MMHG | HEART RATE: 84 BPM | BODY MASS INDEX: 40.43 KG/M2 | SYSTOLIC BLOOD PRESSURE: 142 MMHG | TEMPERATURE: 97 F | WEIGHT: 315 LBS | OXYGEN SATURATION: 95 % | HEIGHT: 74 IN

## 2024-05-13 DIAGNOSIS — R19.5 POSITIVE COLORECTAL CANCER SCREENING USING COLOGUARD TEST: ICD-10-CM

## 2024-05-13 LAB — GLUCOSE BLD MANUAL STRIP-MCNC: 221 MG/DL (ref 74–99)

## 2024-05-13 PROCEDURE — 3600000007 HC OR TIME - EACH INCREMENTAL 1 MINUTE - PROCEDURE LEVEL TWO: Performed by: INTERNAL MEDICINE

## 2024-05-13 PROCEDURE — 3700000001 HC GENERAL ANESTHESIA TIME - INITIAL BASE CHARGE: Performed by: INTERNAL MEDICINE

## 2024-05-13 PROCEDURE — 3600000002 HC OR TIME - INITIAL BASE CHARGE - PROCEDURE LEVEL TWO: Performed by: INTERNAL MEDICINE

## 2024-05-13 PROCEDURE — 45385 COLONOSCOPY W/LESION REMOVAL: CPT | Performed by: INTERNAL MEDICINE

## 2024-05-13 PROCEDURE — 82947 ASSAY GLUCOSE BLOOD QUANT: CPT

## 2024-05-13 PROCEDURE — 7100000009 HC PHASE TWO TIME - INITIAL BASE CHARGE: Performed by: INTERNAL MEDICINE

## 2024-05-13 PROCEDURE — 3700000002 HC GENERAL ANESTHESIA TIME - EACH INCREMENTAL 1 MINUTE: Performed by: INTERNAL MEDICINE

## 2024-05-13 PROCEDURE — 7100000010 HC PHASE TWO TIME - EACH INCREMENTAL 1 MINUTE: Performed by: INTERNAL MEDICINE

## 2024-05-13 PROCEDURE — 2500000004 HC RX 250 GENERAL PHARMACY W/ HCPCS (ALT 636 FOR OP/ED): Performed by: ANESTHESIOLOGY

## 2024-05-13 PROCEDURE — 88305 TISSUE EXAM BY PATHOLOGIST: CPT | Mod: TC,SUR,SAMLAB | Performed by: INTERNAL MEDICINE

## 2024-05-13 PROCEDURE — 88305 TISSUE EXAM BY PATHOLOGIST: CPT | Performed by: PATHOLOGY

## 2024-05-13 RX ORDER — SODIUM CHLORIDE, SODIUM LACTATE, POTASSIUM CHLORIDE, CALCIUM CHLORIDE 600; 310; 30; 20 MG/100ML; MG/100ML; MG/100ML; MG/100ML
100 INJECTION, SOLUTION INTRAVENOUS CONTINUOUS
OUTPATIENT
Start: 2024-05-13

## 2024-05-13 RX ORDER — MIDAZOLAM HYDROCHLORIDE 1 MG/ML
2 INJECTION INTRAMUSCULAR; INTRAVENOUS ONCE AS NEEDED
Status: COMPLETED | OUTPATIENT
Start: 2024-05-13 | End: 2024-05-13

## 2024-05-13 RX ORDER — SODIUM CHLORIDE, SODIUM LACTATE, POTASSIUM CHLORIDE, CALCIUM CHLORIDE 600; 310; 30; 20 MG/100ML; MG/100ML; MG/100ML; MG/100ML
100 INJECTION, SOLUTION INTRAVENOUS CONTINUOUS
Status: DISCONTINUED | OUTPATIENT
Start: 2024-05-13 | End: 2024-05-14 | Stop reason: HOSPADM

## 2024-05-13 RX ORDER — FENTANYL CITRATE 50 UG/ML
INJECTION, SOLUTION INTRAMUSCULAR; INTRAVENOUS AS NEEDED
Status: DISCONTINUED | OUTPATIENT
Start: 2024-05-13 | End: 2024-05-13

## 2024-05-13 RX ORDER — ONDANSETRON HYDROCHLORIDE 2 MG/ML
4 INJECTION, SOLUTION INTRAVENOUS ONCE
Status: COMPLETED | OUTPATIENT
Start: 2024-05-13 | End: 2024-05-13

## 2024-05-13 RX ORDER — PROPOFOL 10 MG/ML
INJECTION, EMULSION INTRAVENOUS AS NEEDED
Status: DISCONTINUED | OUTPATIENT
Start: 2024-05-13 | End: 2024-05-13

## 2024-05-13 RX ADMIN — PROPOFOL 50 MG: 10 INJECTION, EMULSION INTRAVENOUS at 14:38

## 2024-05-13 RX ADMIN — PROPOFOL 50 MG: 10 INJECTION, EMULSION INTRAVENOUS at 14:46

## 2024-05-13 RX ADMIN — MIDAZOLAM HYDROCHLORIDE 2 MG: 1 INJECTION, SOLUTION INTRAMUSCULAR; INTRAVENOUS at 14:22

## 2024-05-13 RX ADMIN — ONDANSETRON 4 MG: 2 INJECTION INTRAMUSCULAR; INTRAVENOUS at 13:36

## 2024-05-13 RX ADMIN — FENTANYL CITRATE 100 MCG: 50 INJECTION, SOLUTION INTRAMUSCULAR; INTRAVENOUS at 14:22

## 2024-05-13 RX ADMIN — PROPOFOL 50 MG: 10 INJECTION, EMULSION INTRAVENOUS at 14:22

## 2024-05-13 RX ADMIN — PROPOFOL 50 MG: 10 INJECTION, EMULSION INTRAVENOUS at 14:27

## 2024-05-13 RX ADMIN — SODIUM CHLORIDE, POTASSIUM CHLORIDE, SODIUM LACTATE AND CALCIUM CHLORIDE 100 ML/HR: 600; 310; 30; 20 INJECTION, SOLUTION INTRAVENOUS at 13:36

## 2024-05-13 SDOH — HEALTH STABILITY: MENTAL HEALTH: CURRENT SMOKER: 0

## 2024-05-13 ASSESSMENT — PAIN - FUNCTIONAL ASSESSMENT
PAIN_FUNCTIONAL_ASSESSMENT: 0-10

## 2024-05-13 ASSESSMENT — PAIN SCALES - GENERAL
PAINLEVEL_OUTOF10: 0 - NO PAIN
PAIN_LEVEL: 3
PAINLEVEL_OUTOF10: 0 - NO PAIN

## 2024-05-13 ASSESSMENT — COLUMBIA-SUICIDE SEVERITY RATING SCALE - C-SSRS
1. IN THE PAST MONTH, HAVE YOU WISHED YOU WERE DEAD OR WISHED YOU COULD GO TO SLEEP AND NOT WAKE UP?: NO
6. HAVE YOU EVER DONE ANYTHING, STARTED TO DO ANYTHING, OR PREPARED TO DO ANYTHING TO END YOUR LIFE?: NO
2. HAVE YOU ACTUALLY HAD ANY THOUGHTS OF KILLING YOURSELF?: NO

## 2024-05-13 NOTE — ANESTHESIA POSTPROCEDURE EVALUATION
Patient: Vince Castillo    Procedure Summary       Date: 05/13/24 Room / Location: [facility] OR    Anesthesia Start: 1422 Anesthesia Stop: 1456    Procedure: COLONOSCOPY Diagnosis: Positive colorectal cancer screening using Cologuard test    Scheduled Providers: Gene Vu DO; Kamaljit Ignacio MD Responsible Provider: Kamaljit Ignacio MD    Anesthesia Type: MAC ASA Status: 2            Anesthesia Type: MAC    Vitals Value Taken Time   BP  05/13/24 1456   Temp  05/13/24 1456   Pulse 76 05/13/24 1456   Resp 12 05/13/24 1456   SpO2 99 05/13/24 1456       Anesthesia Post Evaluation    Patient location during evaluation: PACU  Patient participation: complete - patient participated  Level of consciousness: awake and alert  Pain score: 3  Pain management: adequate  Multimodal analgesia pain management approach  Airway patency: patent  Cardiovascular status: acceptable  Respiratory status: acceptable  Hydration status: acceptable  Postoperative Nausea and Vomiting: none        No notable events documented.

## 2024-05-13 NOTE — H&P
History Of Present Illness  Vince Castillo is a 58 y.o. male presenting with family history of colon cancer presents for colon cancer screening.  Based on his BMI is being performed in OR as he has sleep apnea..     Past Medical History  Past Medical History:   Diagnosis Date    Acute frontal sinusitis, unspecified 12/09/2021    Acute non-recurrent frontal sinusitis    Arthritis see records    Body mass index (BMI) 37.0-37.9, adult 09/08/2021    Body mass index (BMI) of 37.0 to 37.9 in adult    Body mass index (BMI) 38.0-38.9, adult 03/19/2021    Body mass index (BMI) of 38.0 to 38.9 in adult    Change in bowel habit 09/02/2020    Change in bowel habits    Diabetes mellitus (Multi) see records    Encounter for screening for malignant neoplasm of colon 10/25/2019    Encounter for colonoscopy in patient with family history of colon cancer    Hypertension see records    Other prurigo 10/25/2019    Pruritic rash    Other specified symptoms and signs involving the digestive system and abdomen 08/27/2020    Rectal pressure    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    Personal history of other diseases of the respiratory system 11/18/2020    History of acute sinusitis    Personal history of other infectious and parasitic diseases 04/24/2020    History of tinea corporis    Personal history of other specified conditions 01/24/2020    History of chronic cough     Surgical History  Past Surgical History:   Procedure Laterality Date    OTHER SURGICAL HISTORY  09/26/2019    Adenoidectomy    OTHER SURGICAL HISTORY  09/26/2019    Knee arthroscopy    OTHER SURGICAL HISTORY  09/26/2019    Nasal polypectomy    OTHER SURGICAL HISTORY  09/26/2019    Tonsillectomy    OTHER SURGICAL HISTORY  09/26/2019    Arthroscopy    OTHER SURGICAL HISTORY  09/26/2019    Appendectomy    OTHER SURGICAL HISTORY  03/10/2022    Shoulder arthroscopy    OTHER SURGICAL HISTORY  01/24/2020     "Tooth extraction     Social History  He reports that he quit smoking about 17 months ago. His smoking use included cigarettes. He has never used smokeless tobacco. He reports that he does not drink alcohol and does not use drugs.    Family History  Family History   Problem Relation Name Age of Onset    Colonic polyp Father      Colon cancer Other grandparent     Cancer Maternal Grandmother Euincie         Allergies  Allergies   Allergen Reactions    Lisinopril Cough    Losartan Potassium Swelling     Review of Systems  Pre-sedation Evaluation:  ASA Classification - ASA 2 - Patient with mild systemic disease with no functional limitations  Mallampati Score - II (hard and soft palate, upper portion of tonsils and uvula visible)    Physical Exam  Vitals and nursing note reviewed.   Constitutional:       Appearance: Normal appearance.   HENT:      Head: Normocephalic.      Mouth/Throat:      Mouth: Mucous membranes are moist.      Pharynx: Oropharynx is clear.   Eyes:      Pupils: Pupils are equal, round, and reactive to light.   Cardiovascular:      Rate and Rhythm: Normal rate and regular rhythm.      Heart sounds: Normal heart sounds.   Pulmonary:      Effort: Pulmonary effort is normal.      Breath sounds: Normal breath sounds.   Abdominal:      General: Abdomen is flat. Bowel sounds are normal.      Palpations: Abdomen is soft.   Musculoskeletal:         General: Normal range of motion.      Cervical back: Normal range of motion and neck supple.   Skin:     General: Skin is warm and dry.   Neurological:      General: No focal deficit present.      Mental Status: He is alert and oriented to person, place, and time.   Psychiatric:         Mood and Affect: Mood normal.         Behavior: Behavior normal.          Last Recorded Vitals  Blood pressure 155/87, pulse 92, temperature 36.3 °C (97.3 °F), temperature source Temporal, resp. rate 20, height 1.88 m (6' 2\"), weight (!) 165 kg (362 lb 14 oz), SpO2 95%.   "   Assessment/Plan   Problem List Items Addressed This Visit    None  Visit Diagnoses       Positive colorectal cancer screening using Cologuard test        Relevant Orders    Colonoscopy Screening; High Risk Patient               PTA/Current Medications:  (Not in a hospital admission)    Current Outpatient Medications   Medication Sig Dispense Refill    alcohol swabs pads, medicated 5 times a day.      amLODIPine (Norvasc) 5 mg tablet Take 1 tablet (5 mg) by mouth once daily. 90 tablet 1    blood sugar diagnostic (Blood Glucose Test) strip 3 times a day. Blood glucose test in vitro strip      blood-glucose sensor (FreeStyle Cindy 3 Sensor) device Apply 1 each topically every 14 (fourteen) days. 6 each 3    HYDROcodone-acetaminophen (Norco) 7.5-325 mg tablet Take 1 tablet by mouth every 4 hours if needed for severe pain (7 - 10). Do not fill before May 4, 2024. 180 tablet 0    insulin glargine (Lantus) 100 unit/mL (3 mL) pen Inject 45 Units under the skin once daily at bedtime. 3 mL 11    insulin lispro (HumaLOG) 100 unit/mL injection Inject 40 Units under the skin 3 times a day with meals. 45 mL 5    lancets misc 3 times a day.      meloxicam (Mobic) 15 mg tablet Take 1 tablet (15 mg) by mouth once daily. 30 tablet 11    metFORMIN XR (Glucophage-XR) 500 mg 24 hr tablet Take 2 tablets (1,000 mg) by mouth 2 times a day.      sildenafil (Viagra) 100 mg tablet Take 1 tablet (100 mg) by mouth once daily as needed. Take 1 hour before needed      spironolactone (Aldactone) 50 mg tablet Take 1 tablet (50 mg) by mouth once daily. 90 tablet 3    True Metrix Glucose Meter misc USE TO CHECK BLOOD GLUCOSE 4 TIMES DAILY.      dulaglutide (Trulicity) 0.75 mg/0.5 mL pen injector Inject 0.75 mg under the skin 1 (one) time per week. (Patient not taking: Reported on 5/13/2024) 2 mL 11    naloxone (Narcan) 4 mg/0.1 mL nasal spray Administer into affected nostril(s) if needed. Use as directed      NON FORMULARY BP Platinum Natural BP  health and support 1 Daily       Current Facility-Administered Medications   Medication Dose Route Frequency Provider Last Rate Last Admin    lactated Ringer's infusion  100 mL/hr intravenous Continuous Kamaljit Ignacio MD        midazolam (Versed) injection 2 mg  2 mg intravenous Once PRN Kamaljit Ignacio MD        ondansetron (Zofran) injection 4 mg  4 mg intravenous Once MD Gene Kovacs, DO

## 2024-05-13 NOTE — ANESTHESIA PREPROCEDURE EVALUATION
Patient: Vince Castillo    Procedure Information       Date/Time: 05/13/24 1400    Scheduled providers: Gene Vu DO; Kamaljit Ignacio MD    Procedure: COLONOSCOPY    Location: [facility] OR            Relevant Problems   Anesthesia (within normal limits)   (-) Difficult intubation   (-) Malignant hyperthermia   (-) PONV (postoperative nausea and vomiting)      Cardiac   (+) Hypertension   (+) Mixed hyperlipidemia      Pulmonary (within normal limits)      Neuro (within normal limits)      GI   (+) Dysphagia      /Renal (within normal limits)      Liver (within normal limits)      Endocrine   (+) Type 2 diabetes mellitus with hyperglycemia, with long-term current use of insulin (Multi)      Hematology (within normal limits)      Musculoskeletal   (+) Primary osteoarthritis of left hip      HEENT (within normal limits)      ID (within normal limits)      Skin (within normal limits)      GYN (within normal limits)       Clinical information reviewed:   Tobacco  Allergies  Meds   Med Hx  Surg Hx   Fam Hx  Soc Hx        NPO Detail:  NPO/Void Status  Carbohydrate Drink Given Prior to Surgery? : N  Date of Last Liquid: 05/13/24  Time of Last Liquid: 0730  Date of Last Solid: 05/12/24  Time of Last Solid: 0730  Last Intake Type: Clear fluids  Time of Last Void: 1200         Physical Exam    Airway  Mallampati: II  TM distance: >3 FB  Neck ROM: full     Cardiovascular   Rhythm: regular  Rate: normal     Dental - normal exam     Pulmonary   Breath sounds clear to auscultation     Abdominal            Anesthesia Plan    History of general anesthesia?: yes  History of complications of general anesthesia?: no    ASA 2     MAC     The patient is not a current smoker.    intravenous induction   Anesthetic plan and risks discussed with patient.

## 2024-05-22 LAB
LABORATORY COMMENT REPORT: NORMAL
PATH REPORT.FINAL DX SPEC: NORMAL
PATH REPORT.GROSS SPEC: NORMAL
PATH REPORT.TOTAL CANCER: NORMAL

## 2024-05-24 ENCOUNTER — TELEPHONE (OUTPATIENT)
Dept: GASTROENTEROLOGY | Facility: CLINIC | Age: 59
End: 2024-05-24
Payer: COMMERCIAL

## 2024-05-24 NOTE — TELEPHONE ENCOUNTER
PATIENT NOTIFIED AND VERBALIZED UNDERSTANDING     5 year repeat card made and filed    ----- Message from Gene Vu DO sent at 5/24/2024  2:05 PM EDT -----  Adenomatous polyp.  Repeat colonoscopy in 5 years

## 2024-06-03 DIAGNOSIS — G44.86 CERVICOGENIC HEADACHE: ICD-10-CM

## 2024-06-03 DIAGNOSIS — I10 PRIMARY HYPERTENSION: ICD-10-CM

## 2024-06-03 DIAGNOSIS — N52.9 IMPOTENCE OF ORGANIC ORIGIN: ICD-10-CM

## 2024-06-03 RX ORDER — HYDROCODONE BITARTRATE AND ACETAMINOPHEN 7.5; 325 MG/1; MG/1
1 TABLET ORAL EVERY 4 HOURS PRN
Qty: 180 TABLET | Refills: 0 | Status: SHIPPED | OUTPATIENT
Start: 2024-06-03 | End: 2024-07-03

## 2024-06-03 RX ORDER — SILDENAFIL 100 MG/1
100 TABLET, FILM COATED ORAL DAILY PRN
Qty: 10 TABLET | Refills: 2 | Status: SHIPPED | OUTPATIENT
Start: 2024-06-03

## 2024-06-03 RX ORDER — SPIRONOLACTONE 50 MG/1
50 TABLET, FILM COATED ORAL DAILY
Qty: 90 TABLET | Refills: 3 | Status: SHIPPED | OUTPATIENT
Start: 2024-06-03 | End: 2025-06-03

## 2024-06-03 RX ORDER — AMLODIPINE BESYLATE 5 MG/1
5 TABLET ORAL DAILY
Qty: 90 TABLET | Refills: 3 | Status: SHIPPED | OUTPATIENT
Start: 2024-06-03

## 2024-07-01 DIAGNOSIS — G44.86 CERVICOGENIC HEADACHE: ICD-10-CM

## 2024-07-01 RX ORDER — HYDROCODONE BITARTRATE AND ACETAMINOPHEN 7.5; 325 MG/1; MG/1
1 TABLET ORAL EVERY 4 HOURS PRN
Qty: 180 TABLET | Refills: 0 | Status: SHIPPED | OUTPATIENT
Start: 2024-07-01 | End: 2024-07-31

## 2024-07-12 ENCOUNTER — APPOINTMENT (OUTPATIENT)
Dept: PRIMARY CARE | Facility: CLINIC | Age: 59
End: 2024-07-12
Payer: COMMERCIAL

## 2024-07-16 ENCOUNTER — APPOINTMENT (OUTPATIENT)
Dept: PRIMARY CARE | Facility: CLINIC | Age: 59
End: 2024-07-16
Payer: COMMERCIAL

## 2024-07-16 VITALS
HEART RATE: 81 BPM | WEIGHT: 315 LBS | DIASTOLIC BLOOD PRESSURE: 84 MMHG | OXYGEN SATURATION: 96 % | BODY MASS INDEX: 45.71 KG/M2 | SYSTOLIC BLOOD PRESSURE: 140 MMHG

## 2024-07-16 DIAGNOSIS — G44.86 CERVICOGENIC HEADACHE: Primary | ICD-10-CM

## 2024-07-16 DIAGNOSIS — D12.2 ADENOMATOUS POLYP OF ASCENDING COLON: ICD-10-CM

## 2024-07-16 DIAGNOSIS — Z79.4 TYPE 2 DIABETES MELLITUS WITH HYPERGLYCEMIA, WITH LONG-TERM CURRENT USE OF INSULIN (MULTI): ICD-10-CM

## 2024-07-16 DIAGNOSIS — R13.10 DYSPHAGIA, UNSPECIFIED TYPE: ICD-10-CM

## 2024-07-16 DIAGNOSIS — G47.31 PRIMARY CENTRAL SLEEP APNEA: ICD-10-CM

## 2024-07-16 DIAGNOSIS — E11.65 TYPE 2 DIABETES MELLITUS WITH HYPERGLYCEMIA, WITH LONG-TERM CURRENT USE OF INSULIN (MULTI): ICD-10-CM

## 2024-07-16 DIAGNOSIS — E78.2 MIXED HYPERLIPIDEMIA: ICD-10-CM

## 2024-07-16 DIAGNOSIS — J31.0 SEVERE CHRONIC RHINITIS: ICD-10-CM

## 2024-07-16 DIAGNOSIS — I10 PRIMARY HYPERTENSION: ICD-10-CM

## 2024-07-16 DIAGNOSIS — D17.0 LIPOMA OF SCALP: ICD-10-CM

## 2024-07-16 DIAGNOSIS — M16.12 PRIMARY OSTEOARTHRITIS OF LEFT HIP: ICD-10-CM

## 2024-07-16 DIAGNOSIS — R91.1 LUNG NODULE, SOLITARY: ICD-10-CM

## 2024-07-16 DIAGNOSIS — E66.01 MORBID OBESITY WITH BMI OF 45.0-49.9, ADULT (MULTI): ICD-10-CM

## 2024-07-16 PROCEDURE — 1036F TOBACCO NON-USER: CPT | Performed by: FAMILY MEDICINE

## 2024-07-16 PROCEDURE — 99214 OFFICE O/P EST MOD 30 MIN: CPT | Performed by: FAMILY MEDICINE

## 2024-07-16 PROCEDURE — 3079F DIAST BP 80-89 MM HG: CPT | Performed by: FAMILY MEDICINE

## 2024-07-16 PROCEDURE — 3051F HG A1C>EQUAL 7.0%<8.0%: CPT | Performed by: FAMILY MEDICINE

## 2024-07-16 PROCEDURE — 3008F BODY MASS INDEX DOCD: CPT | Performed by: FAMILY MEDICINE

## 2024-07-16 PROCEDURE — 3077F SYST BP >= 140 MM HG: CPT | Performed by: FAMILY MEDICINE

## 2024-07-16 PROCEDURE — 3050F LDL-C >= 130 MG/DL: CPT | Performed by: FAMILY MEDICINE

## 2024-07-16 PROCEDURE — 3062F POS MACROALBUMINURIA REV: CPT | Performed by: FAMILY MEDICINE

## 2024-07-16 ASSESSMENT — PATIENT HEALTH QUESTIONNAIRE - PHQ9
1. LITTLE INTEREST OR PLEASURE IN DOING THINGS: NOT AT ALL
2. FEELING DOWN, DEPRESSED OR HOPELESS: NOT AT ALL
SUM OF ALL RESPONSES TO PHQ9 QUESTIONS 1 AND 2: 0

## 2024-07-16 NOTE — PROGRESS NOTES
Subjective   Patient ID: Vince Castillo is a 59 y.o. male who presents for Med Management (Abnormal BM's, cramping).    HPI   Has a lung nodule and had a PET for a couple of nodules.  Mild uptake in one of them and in a small pleural effusion.  Dr Clark is following and did a CT with stable results in June.   Smoking  quit December 2022.       Chronic Rhinitis - some years gets seasonal shot. Quit smoking and thinks may have helped. Occasionally dysphagia. Chewing better and using water.        Lipoma not changed on scalp      Diabetes  got out of control last year so he went to [place] ER and admitted. Since then has been following with Jimmy Walton. Now on  SS Humalog and Lantus 45. Feels a lot better. Could never get Ozempic or Rybelsus or Trulicity.  Now has Trulicity to try.  Lost 9 pounds without that this time.  Had blurry vision that is better now.  Joints are now stiff and swollen in the hands after he was put on Insulin.  Wearing compression stockings and on spironolactone for swelling in legs.  Better in hands with Voltaren.  No Chest pain, Dyspnea, palpitations, numbness, weakness, claudications, or double vision/ loss of vision.  Weight is up again.  BMI 45.  A1C was 7.6 in January down from 12.6.       Chronic neck pain as before and in shoulder and hip  Pain 5-10 with drop of 4-5 with meds.,  3-4 hours.  Better with Mobic.  Able to sleep with this and do his work. Would like to go on long walks and swimming. Has had PT and injections in shoulder but not neck.  Has not been to pain management.  Has been achier since he was on insulin.      Sleep apnea - has been to Dr Aolnzo and had in lab titration and now on BiPap 12/8.  Feels better.  Has central.      HTN doing well on current meds. Dr Meyers started Amlodipine and spironolactone. For now will make no changes and hopefully can get the weight back down and get this down to 120s.  Fatigue on Metoprolol ER.  Chlorthalidone was stopped due to polyuria  which turns out to be due to the diabetes.       Dysphagia has controlled with diet changes      Hyperlipidemia - Much better TG even though he stopped the fenofibrate. .  in January. To check today.           UDS 7/17/20 - as expected for medication profile 9/8/21 as expected for medication profile 10/7/22 as expected for medication profile 10/6/23 as expected for medication profile         CSA 10/6/23  ORT 4/24/20 score 0  PSA 7/1/22 in labs today   Cologuard 4/22/24 positive and had scope with polyp so will check again in 5 years,.     Review of Systems    Objective   /84 (BP Location: Left arm, Patient Position: Sitting)   Pulse 81   Wt (!) 161 kg (356 lb)   SpO2 96%   BMI 45.71 kg/m²     Physical Exam  Vitals reviewed.   Constitutional:       General: He is not in acute distress.     Appearance: Normal appearance. He is obese.   HENT:      Head: Normocephalic.      Right Ear: Tympanic membrane, ear canal and external ear normal.      Left Ear: Tympanic membrane, ear canal and external ear normal.      Nose: Nose normal.      Mouth/Throat:      Mouth: Mucous membranes are moist.      Pharynx: Oropharynx is clear.      Comments: No uvula  Eyes:      Extraocular Movements: Extraocular movements intact.      Conjunctiva/sclera: Conjunctivae normal.      Pupils: Pupils are equal, round, and reactive to light.   Neck:      Vascular: No carotid bruit.   Cardiovascular:      Rate and Rhythm: Normal rate and regular rhythm.      Pulses: Normal pulses.      Heart sounds: Normal heart sounds. No murmur heard.  Pulmonary:      Effort: Pulmonary effort is normal. No respiratory distress.      Breath sounds: Normal breath sounds.   Abdominal:      General: Abdomen is flat. Bowel sounds are normal. There is no distension.      Palpations: Abdomen is soft. There is no mass.      Tenderness: There is no abdominal tenderness.   Musculoskeletal:         General: Tenderness (paraceervical) present.       Cervical back: Normal range of motion and neck supple. No tenderness.   Lymphadenopathy:      Cervical: No cervical adenopathy.   Skin:     General: Skin is warm and dry.      Findings: No rash.   Neurological:      General: No focal deficit present.      Mental Status: He is alert and oriented to person, place, and time.   Psychiatric:         Mood and Affect: Mood normal.         Thought Content: Thought content normal.         Judgment: Judgment normal.         Assessment/Plan   Diagnoses and all orders for this visit:  Cervicogenic headache  -     Follow Up In Primary Care - Established; Future  Morbid obesity with BMI of 45.0-49.9, adult (Multi)  Primary hypertension  Lung nodule, solitary  Adenomatous polyp of ascending colon  Mixed hyperlipidemia  Severe chronic rhinitis  Primary central sleep apnea  Type 2 diabetes mellitus with hyperglycemia, with long-term current use of insulin (Multi)  Lipoma of scalp  Dysphagia, unspecified type  Primary osteoarthritis of left hip

## 2024-07-30 DIAGNOSIS — G44.86 CERVICOGENIC HEADACHE: ICD-10-CM

## 2024-07-30 RX ORDER — HYDROCODONE BITARTRATE AND ACETAMINOPHEN 7.5; 325 MG/1; MG/1
1 TABLET ORAL EVERY 4 HOURS PRN
Qty: 180 TABLET | Refills: 0 | Status: SHIPPED | OUTPATIENT
Start: 2024-07-30 | End: 2024-08-29

## 2024-08-28 DIAGNOSIS — G44.86 CERVICOGENIC HEADACHE: ICD-10-CM

## 2024-08-28 RX ORDER — HYDROCODONE BITARTRATE AND ACETAMINOPHEN 7.5; 325 MG/1; MG/1
1 TABLET ORAL EVERY 4 HOURS PRN
Qty: 180 TABLET | Refills: 0 | Status: SHIPPED | OUTPATIENT
Start: 2024-08-30 | End: 2024-09-29

## 2024-09-27 DIAGNOSIS — G44.86 CERVICOGENIC HEADACHE: ICD-10-CM

## 2024-09-27 RX ORDER — HYDROCODONE BITARTRATE AND ACETAMINOPHEN 7.5; 325 MG/1; MG/1
1 TABLET ORAL EVERY 4 HOURS PRN
Qty: 180 TABLET | Refills: 0 | Status: SHIPPED | OUTPATIENT
Start: 2024-09-27 | End: 2024-10-27

## 2024-10-18 ENCOUNTER — HOSPITAL ENCOUNTER (OUTPATIENT)
Dept: RADIOLOGY | Facility: CLINIC | Age: 59
Discharge: HOME | End: 2024-10-18
Payer: COMMERCIAL

## 2024-10-18 ENCOUNTER — APPOINTMENT (OUTPATIENT)
Dept: PRIMARY CARE | Facility: CLINIC | Age: 59
End: 2024-10-18
Payer: COMMERCIAL

## 2024-10-18 ENCOUNTER — LAB (OUTPATIENT)
Dept: LAB | Facility: LAB | Age: 59
End: 2024-10-18
Payer: COMMERCIAL

## 2024-10-18 ENCOUNTER — TELEPHONE (OUTPATIENT)
Dept: PAIN MEDICINE | Facility: CLINIC | Age: 59
End: 2024-10-18

## 2024-10-18 VITALS
BODY MASS INDEX: 43.53 KG/M2 | SYSTOLIC BLOOD PRESSURE: 138 MMHG | HEART RATE: 67 BPM | DIASTOLIC BLOOD PRESSURE: 84 MMHG | OXYGEN SATURATION: 95 % | WEIGHT: 315 LBS

## 2024-10-18 DIAGNOSIS — Z00.00 HEALTHCARE MAINTENANCE: ICD-10-CM

## 2024-10-18 DIAGNOSIS — M16.12 PRIMARY OSTEOARTHRITIS OF LEFT HIP: ICD-10-CM

## 2024-10-18 DIAGNOSIS — D17.0 LIPOMA OF SCALP: ICD-10-CM

## 2024-10-18 DIAGNOSIS — I10 PRIMARY HYPERTENSION: ICD-10-CM

## 2024-10-18 DIAGNOSIS — R91.1 LUNG NODULE, SOLITARY: ICD-10-CM

## 2024-10-18 DIAGNOSIS — E78.2 MIXED HYPERLIPIDEMIA: ICD-10-CM

## 2024-10-18 DIAGNOSIS — G47.31 PRIMARY CENTRAL SLEEP APNEA: ICD-10-CM

## 2024-10-18 DIAGNOSIS — M19.011 ARTHRITIS OF BOTH ACROMIOCLAVICULAR JOINTS: ICD-10-CM

## 2024-10-18 DIAGNOSIS — G44.86 CERVICOGENIC HEADACHE: Primary | ICD-10-CM

## 2024-10-18 DIAGNOSIS — Z79.4 TYPE 2 DIABETES MELLITUS WITH HYPERGLYCEMIA, WITH LONG-TERM CURRENT USE OF INSULIN: Primary | ICD-10-CM

## 2024-10-18 DIAGNOSIS — R13.10 DYSPHAGIA, UNSPECIFIED TYPE: ICD-10-CM

## 2024-10-18 DIAGNOSIS — E11.65 TYPE 2 DIABETES MELLITUS WITH HYPERGLYCEMIA, WITH LONG-TERM CURRENT USE OF INSULIN: Primary | ICD-10-CM

## 2024-10-18 DIAGNOSIS — N48.1 BALANITIS: ICD-10-CM

## 2024-10-18 DIAGNOSIS — Z79.899 MEDICATION MANAGEMENT: ICD-10-CM

## 2024-10-18 DIAGNOSIS — E11.65 TYPE 2 DIABETES MELLITUS WITH HYPERGLYCEMIA, WITH LONG-TERM CURRENT USE OF INSULIN: ICD-10-CM

## 2024-10-18 DIAGNOSIS — G44.86 CERVICOGENIC HEADACHE: ICD-10-CM

## 2024-10-18 DIAGNOSIS — E66.01 MORBID OBESITY WITH BMI OF 45.0-49.9, ADULT (MULTI): ICD-10-CM

## 2024-10-18 DIAGNOSIS — Z79.4 TYPE 2 DIABETES MELLITUS WITH HYPERGLYCEMIA, WITH LONG-TERM CURRENT USE OF INSULIN: ICD-10-CM

## 2024-10-18 DIAGNOSIS — D12.2 ADENOMATOUS POLYP OF ASCENDING COLON: ICD-10-CM

## 2024-10-18 DIAGNOSIS — M19.012 ARTHRITIS OF BOTH ACROMIOCLAVICULAR JOINTS: ICD-10-CM

## 2024-10-18 DIAGNOSIS — J31.0 SEVERE CHRONIC RHINITIS: ICD-10-CM

## 2024-10-18 LAB
ALBUMIN SERPL BCP-MCNC: 4.4 G/DL (ref 3.4–5)
ALP SERPL-CCNC: 104 U/L (ref 33–120)
ALT SERPL W P-5'-P-CCNC: 39 U/L (ref 10–52)
ANION GAP SERPL CALC-SCNC: 12 MMOL/L (ref 10–20)
AST SERPL W P-5'-P-CCNC: 23 U/L (ref 9–39)
BILIRUB SERPL-MCNC: 0.8 MG/DL (ref 0–1.2)
BUN SERPL-MCNC: 15 MG/DL (ref 6–23)
CALCIUM SERPL-MCNC: 9.7 MG/DL (ref 8.6–10.3)
CHLORIDE SERPL-SCNC: 96 MMOL/L (ref 98–107)
CO2 SERPL-SCNC: 32 MMOL/L (ref 21–32)
CREAT SERPL-MCNC: 0.82 MG/DL (ref 0.5–1.3)
EGFRCR SERPLBLD CKD-EPI 2021: >90 ML/MIN/1.73M*2
ERYTHROCYTE [DISTWIDTH] IN BLOOD BY AUTOMATED COUNT: 12.9 % (ref 11.5–14.5)
EST. AVERAGE GLUCOSE BLD GHB EST-MCNC: 289 MG/DL
GLUCOSE SERPL-MCNC: 373 MG/DL (ref 74–99)
HBA1C MFR BLD: 11.7 %
HCT VFR BLD AUTO: 52.8 % (ref 41–52)
HGB BLD-MCNC: 17.5 G/DL (ref 13.5–17.5)
MCH RBC QN AUTO: 28.5 PG (ref 26–34)
MCHC RBC AUTO-ENTMCNC: 33.1 G/DL (ref 32–36)
MCV RBC AUTO: 86 FL (ref 80–100)
NRBC BLD-RTO: 0 /100 WBCS (ref 0–0)
PLATELET # BLD AUTO: 204 X10*3/UL (ref 150–450)
POTASSIUM SERPL-SCNC: 5 MMOL/L (ref 3.5–5.3)
PROT SERPL-MCNC: 7.3 G/DL (ref 6.4–8.2)
PSA SERPL-MCNC: 0.59 NG/ML
RBC # BLD AUTO: 6.13 X10*6/UL (ref 4.5–5.9)
SODIUM SERPL-SCNC: 135 MMOL/L (ref 136–145)
TRIGL SERPL-MCNC: 351 MG/DL (ref 0–149)
WBC # BLD AUTO: 5.2 X10*3/UL (ref 4.4–11.3)

## 2024-10-18 PROCEDURE — 3050F LDL-C >= 130 MG/DL: CPT | Performed by: FAMILY MEDICINE

## 2024-10-18 PROCEDURE — 85027 COMPLETE CBC AUTOMATED: CPT

## 2024-10-18 PROCEDURE — 3075F SYST BP GE 130 - 139MM HG: CPT | Performed by: FAMILY MEDICINE

## 2024-10-18 PROCEDURE — 3079F DIAST BP 80-89 MM HG: CPT | Performed by: FAMILY MEDICINE

## 2024-10-18 PROCEDURE — 72040 X-RAY EXAM NECK SPINE 2-3 VW: CPT

## 2024-10-18 PROCEDURE — 84153 ASSAY OF PSA TOTAL: CPT

## 2024-10-18 PROCEDURE — 83036 HEMOGLOBIN GLYCOSYLATED A1C: CPT

## 2024-10-18 PROCEDURE — 1036F TOBACCO NON-USER: CPT | Performed by: FAMILY MEDICINE

## 2024-10-18 PROCEDURE — 36415 COLL VENOUS BLD VENIPUNCTURE: CPT

## 2024-10-18 PROCEDURE — 84478 ASSAY OF TRIGLYCERIDES: CPT

## 2024-10-18 PROCEDURE — 3062F POS MACROALBUMINURIA REV: CPT | Performed by: FAMILY MEDICINE

## 2024-10-18 PROCEDURE — 99214 OFFICE O/P EST MOD 30 MIN: CPT | Performed by: FAMILY MEDICINE

## 2024-10-18 PROCEDURE — 80053 COMPREHEN METABOLIC PANEL: CPT

## 2024-10-18 PROCEDURE — 3051F HG A1C>EQUAL 7.0%<8.0%: CPT | Performed by: FAMILY MEDICINE

## 2024-10-18 RX ORDER — FLUCONAZOLE 100 MG/1
TABLET ORAL
Qty: 11 TABLET | Refills: 0 | Status: SHIPPED | OUTPATIENT
Start: 2024-10-18 | End: 2024-10-28

## 2024-10-18 RX ORDER — CYCLOBENZAPRINE HCL 10 MG
1 TABLET ORAL 3 TIMES DAILY PRN
COMMUNITY
Start: 2024-10-14

## 2024-10-18 NOTE — PROGRESS NOTES
Subjective   Patient ID: Vince Castillo is a 59 y.o. male who presents for Med Management (Burning when urinating due to skin infection).    HPI   Has a lung nodule and had a PET for a couple of nodules.  Mild uptake in one of them and in a small pleural effusion.  Dr Clark is following and did a CT with stable results in June.   Smoking  quit December 2022.       Chronic Rhinitis - some years gets seasonal shot. Quit smoking and thinks may have helped. Occasionally dysphagia. Chewing better and using water.        Lipoma not changed on scalp      Diabetes  got out of control last year so he went to [place] ER and admitted. Since then has been following with Jimmy Walton. Now on  SS Humalog and Lantus 45. Feels a lot better. Finally got Trulicity to try.  Lost 17 pounds this time.  Had blurry vision that is better now.  Joints are now stiff and swollen in the hands after he was put on Insulin. Wearing compression stockings and on spironolactone for swelling in legs.  Better in hands with Voltaren.  No Chest pain, Dyspnea, palpitations, numbness, weakness, claudications, or double vision/ loss of vision.   BMI 43.  A1C was 7.6 in January down from 12.6.  Needs to do labs. But average sugar around 150.      Chronic neck pain as before and in shoulder and hip  Pain 5-9 with drop of 4-5 with meds.,  3-5 hours.  Better with Mobic.  Able to sleep with this and do his work. Would like to go on long walks and swimming. Has had PT and injections in shoulder but not neck.  Has not been to pain management.  Dr Alberto for hip and neck.  So will refer.   Has been achier since he was on insulin.      Sleep apnea - has been to Dr Alonzo and had in lab titration and now on BiPap 12/8.  Feels better.  Has central.      HTN doing well on current meds. Dr Meyers started Amlodipine and spironolactone. No longer sees her.  Hopefully with weight loss can get this down to 120s.  Fatigue on Metoprolol ER.  Chlorthalidone was stopped due to  polyuria which turns out to be due to the diabetes.      Hyperlipidemia - Much better TG even though he stopped the fenofibrate. .  in January. To check today.       To ER a couple months ago with abrasion then cellulitis of right shin. Pain and swelling gone but discoloration remains.     Testicle and scrotum swollen with rash on foreskin. So cannot get urine specimen      UDS 7/17/20 - as expected for medication profile 9/8/21 as expected for medication profile 10/7/22 as expected for medication profile 10/6/23 as expected for medication profile 10/18/24 on hold due to above issue with penis.         CSA 10/18/24  ORT 4/24/20 score 0  PSA 7/1/22 in labs today   Cologuard 4/22/24 positive and had scope (5/13/24) with polyp so will check again in 5 years.       Review of Systems    Objective   /84 (BP Location: Left arm, Patient Position: Sitting)   Pulse 67   Wt (!) 154 kg (339 lb)   SpO2 95%   BMI 43.53 kg/m²     Physical Exam  Vitals reviewed.   Constitutional:       General: He is not in acute distress.     Appearance: Normal appearance. He is obese.   HENT:      Head: Normocephalic.      Right Ear: Tympanic membrane, ear canal and external ear normal.      Left Ear: Tympanic membrane, ear canal and external ear normal.      Nose: Nose normal.      Mouth/Throat:      Mouth: Mucous membranes are moist.      Pharynx: Oropharynx is clear.      Comments: No uvula and Mallampati 3  Eyes:      Extraocular Movements: Extraocular movements intact.      Conjunctiva/sclera: Conjunctivae normal.      Pupils: Pupils are equal, round, and reactive to light.   Neck:      Vascular: No carotid bruit.   Cardiovascular:      Rate and Rhythm: Normal rate and regular rhythm.      Pulses: Normal pulses.      Heart sounds: Normal heart sounds. No murmur heard.  Pulmonary:      Effort: Pulmonary effort is normal. No respiratory distress.      Breath sounds: Normal breath sounds.   Abdominal:      General: Abdomen  is flat. Bowel sounds are normal. There is no distension.      Palpations: Abdomen is soft.      Tenderness: There is no abdominal tenderness.      Comments: Protuberant    Genitourinary:     Comments: Cannot visualize penis. Has very prominent suprapubic area and retracted penis. Red in cavity. White discoloration   Musculoskeletal:      Cervical back: Normal range of motion. Rigidity (very limited ROM in the neck in all motions) and tenderness (base of neck bilaterally) present.   Lymphadenopathy:      Cervical: No cervical adenopathy.   Skin:     General: Skin is warm and dry.      Findings: No rash.      Comments: Dark tan skin right shin    Neurological:      General: No focal deficit present.      Mental Status: He is alert and oriented to person, place, and time.   Psychiatric:         Mood and Affect: Mood normal.         Thought Content: Thought content normal.         Judgment: Judgment normal.         Assessment/Plan   Diagnoses and all orders for this visit:  Type 2 diabetes mellitus with hyperglycemia, with long-term current use of insulin  Cervicogenic headache  -     Follow Up In Primary Care - Established  -     XR cervical spine 2-3 views; Future  -     Referral to Pain Medicine; Future  Medication management  Adenomatous polyp of ascending colon  Arthritis of both acromioclavicular joints  Dysphagia, unspecified type  Primary hypertension  Lipoma of scalp  Lung nodule, solitary  Mixed hyperlipidemia  Morbid obesity with BMI of 45.0-49.9, adult (Multi)  Primary osteoarthritis of left hip  Severe chronic rhinitis  Primary central sleep apnea  Balanitis  -     fluconazole (Diflucan) 100 mg tablet; Take 2 tablets (200 mg) by mouth once daily for 1 day, THEN 1 tablet (100 mg) once daily for 9 days.  Other orders  -     Follow Up In Primary Care - Established; Future

## 2024-10-25 DIAGNOSIS — G44.86 CERVICOGENIC HEADACHE: ICD-10-CM

## 2024-10-25 RX ORDER — HYDROCODONE BITARTRATE AND ACETAMINOPHEN 7.5; 325 MG/1; MG/1
1 TABLET ORAL EVERY 4 HOURS PRN
Qty: 180 TABLET | Refills: 0 | Status: SHIPPED | OUTPATIENT
Start: 2024-10-25 | End: 2024-11-24

## 2024-10-28 ENCOUNTER — DOCUMENTATION (OUTPATIENT)
Dept: PHYSICAL THERAPY | Facility: CLINIC | Age: 59
End: 2024-10-28
Payer: COMMERCIAL

## 2024-11-25 DIAGNOSIS — G44.86 CERVICOGENIC HEADACHE: ICD-10-CM

## 2024-11-25 RX ORDER — HYDROCODONE BITARTRATE AND ACETAMINOPHEN 7.5; 325 MG/1; MG/1
1 TABLET ORAL EVERY 4 HOURS PRN
Qty: 180 TABLET | Refills: 0 | Status: SHIPPED | OUTPATIENT
Start: 2024-11-25 | End: 2024-12-25

## 2024-12-11 ENCOUNTER — OFFICE VISIT (OUTPATIENT)
Dept: PAIN MEDICINE | Facility: CLINIC | Age: 59
End: 2024-12-11
Payer: COMMERCIAL

## 2024-12-11 VITALS
SYSTOLIC BLOOD PRESSURE: 149 MMHG | HEART RATE: 101 BPM | WEIGHT: 315 LBS | RESPIRATION RATE: 16 BRPM | DIASTOLIC BLOOD PRESSURE: 98 MMHG | BODY MASS INDEX: 43.01 KG/M2

## 2024-12-11 DIAGNOSIS — M47.812 OSTEOARTHRITIS OF CERVICAL SPINE, UNSPECIFIED SPINAL OSTEOARTHRITIS COMPLICATION STATUS: Primary | ICD-10-CM

## 2024-12-11 DIAGNOSIS — G44.86 CERVICOGENIC HEADACHE: ICD-10-CM

## 2024-12-11 PROCEDURE — 99213 OFFICE O/P EST LOW 20 MIN: CPT | Performed by: STUDENT IN AN ORGANIZED HEALTH CARE EDUCATION/TRAINING PROGRAM

## 2024-12-11 RX ORDER — BUPIVACAINE HYDROCHLORIDE 5 MG/ML
3 INJECTION, SOLUTION EPIDURAL; INTRACAUDAL ONCE
OUTPATIENT
Start: 2024-12-11 | End: 2024-12-11

## 2024-12-11 RX ORDER — LIDOCAINE HYDROCHLORIDE 20 MG/ML
10 INJECTION, SOLUTION EPIDURAL; INFILTRATION; INTRACAUDAL; PERINEURAL ONCE
OUTPATIENT
Start: 2024-12-11 | End: 2024-12-11

## 2024-12-11 ASSESSMENT — COLUMBIA-SUICIDE SEVERITY RATING SCALE - C-SSRS
1. IN THE PAST MONTH, HAVE YOU WISHED YOU WERE DEAD OR WISHED YOU COULD GO TO SLEEP AND NOT WAKE UP?: NO
2. HAVE YOU ACTUALLY HAD ANY THOUGHTS OF KILLING YOURSELF?: NO
6. HAVE YOU EVER DONE ANYTHING, STARTED TO DO ANYTHING, OR PREPARED TO DO ANYTHING TO END YOUR LIFE?: NO

## 2024-12-11 NOTE — PROGRESS NOTES
Subjective   Patient ID: Vince Castillo is a 59 y.o. male who presents for Pain (NEW PATIENT, ONGOING NECK, RIGHT SHOULDER AND LEFT HIP PAIN,  HE SEES DR. RAGSDALE FOR THIS PAIN, DR. DOMINGUEZ HAS BEEN PRESCRIBING NORCO FOR THE PATIENT BUT IS RETIRING, HE IS ALREADY APPROVED FOR A LEFT HIP REPLACEMENT WITH DR. RAGSDALE, HE HASN'T DONE RECENT PHYSICAL THERAPY, NO CHIROPRACTIC CARE, ). TAKING IBUPROFEN, HE CANNOT TURN HIS HEAD IN EITHER DIRECTION AND GETS SEVERE HEADACHES CANNOT SLEEP WITHOUT TAKING 2 NORCO, HIS RIGHT SHOULDER ACHING LOCKED FEELING AND GRINDING, LEFT HIP IS LOCKING KNOCKING PAIN, WHEN HE WALKS HE FEELS IT KNOCKING, PAIN SCORE WITH MEDS 5/10 WITHOUT MEDS 9/10, LISA=52%, SOAPP=2, DEP NO, SMOKING NO  HPI  Patient has a longstanding history of left hip pain as well as bilateral shoulder pain and neck pain this has been going on for at least 4 to 5 years.  He has been working with his orthopedist for his hip as well as bilateral shoulder pain.  He has been prescribed Norco by his primary physician for several years.  The primary of complaint today that we can address for him is his neck.  We discussed at length his neck pain that is worse with twisting turning and bending and it causes axial neck pain that is describes a dull ache that can be sharp/stabbing in nature, this pain can be rated as a 5/10 at its best and 9/10 at its worst.  He has attempted formal physical therapy in the past and was unable to complete it secondary to increased pain levels.  In addition, he has tried a home stretching and strengthening regimen that has not provided significant lasting relief.  He has also tried muscle relaxants as well as NSAIDs and acetaminophen without significant lasting relief for his neck pain.  We did review his x-ray of his cervical spine discussed that he has multilevel degenerative changes.  These pains do not radiate down his upper extremities he does not have any significant radicular symptoms appears to  be axial cervical spine pain secondary to cervical spondyloarthropathy.  There are also appears to be a myofascial component to this pain.  Review of Systems    Objective   Physical Exam  Constitutional: No acute distress, well appearing and well nourished. Patient appears stated age.  Eyes: Conjunctiva non-icteric and eye lids are without obvious rash or drooping. Pupils are symmetric.  Ears, Nose, Mouth, and Throat: External ears and nose appear to be without deformity or rash. No lesions or masses noted.  Hearing is grossly intact.  Neck: No JVD noted, tracheal position is midline.  Head and Face: Examination of the head and face revealed no abnormalities.  Respiratory: No gasping or shortness of breath noted, no use of accessory muscles noted.  Cardiovascular: Examination for edema is normal.   GI: Abdomen nontender to palpation.  Skin: No rashes or open lesions/ulcers identified on examined areas.    MSK:   No asymmetry or masses noted of the musculature.   Examination of the muscles/joints/bones show grossly normal range of motion unless noted below.    Neurologic:  Motor strength:   5/5 muscle strength of the upper extremities bilateral and equal.  5/5 muscle strength of the lower extremities bilaterally and equal.     Sensation:   Sensation intact to light touch in the bilateral upper and lower extremities.    Provocative tests: Tenderness to palpation anterior and posterior aspect of both shoulders.  Negative Luis sign bilaterally, negative Spurling sign bilaterally, cervical facet loading did reproduce axial pain bilaterally with tenderness palpation hypertonicity in the cervical paraspinal musculature.    Psychiatric: Mood and affect are normal.    Assessment/Plan   Diagnoses and all orders for this visit:  Osteoarthritis of cervical spine, unspecified spinal osteoarthritis complication status  -     FL pain management; Future  -      Medial Nerve Branch Block; Future  Cervicogenic headache  -      Referral to Pain Medicine  -     FL pain management; Future  -      Medial Nerve Branch Block; Future  Other orders  -     NPO Diet Except: Sips with meds; Effective now; Standing  -     Height and weight; Standing  -     POCT Glucose; Standing  -     lidocaine PF (Xylocaine) 20 mg/mL (2 %) injection 200 mg  -     iohexol (OMNIPaque) 300 mg iodine/mL solution 3 mL  -     bupivacaine PF 0.5 % (Marcaine) 0.5 % (5 mg/mL) injection 15 mg  -     NPO Diet Except: Sips with meds; Effective now; Standing  -     Height and weight; Standing  -     POCT Glucose; Standing  -     Type And Screen; Standing  -     Inpatient consult to Respiratory Care; Standing  -     Adult diet Regular; Standing  -     Vital Signs; Standing  -     Notify provider (specify parameters); Standing  -     Continue IV fluids ordered pre-procedure; Standing  -     Prior to Discharge O2 Weaning; Standing  -     Pulse oximetry, continuous; Standing  -     Discharge patient; Standing  The patient´s history, physical exam and personal review of imaging indicate diagnoses of the above items.    Plan description:  -We do lengthy discussion about etiologies of his neck pain as well as prior treatments it appears that he has failed a significant number of conservative measures thus far we discussed a plan to perform cervical medial branch blocks to address the C3/4 and C4/5 facet joints under fluoroscopic guidance.  If we have a successful series of medial branch blocks we can consider radiofrequency ablation to target these levels.  -We also reviewed his opioid medication, if this continues to provide pain relief he can continue to receive these from his prescribing physicians.  We did encourage him to discuss potential for weaning these medications around the time of surgery as this may be beneficial to reduce his opioid burden and may make it easier to control his pain post procedurally if he does not end up having orthopedic surgery.  -Follow-up as soon as  able post medial branch blocks or sooner if any issues arise.    Counseling:  The patient was counseled regarding diagnostic results, instructions for management, risk factor reductions, prognosis, patient and family education, impressions, risk and benefits of treatment options, as well as adherence to current treatment regimen. The importance of physical therapy/core strengthening was discussed in regard to an appropriate level for the patient to participate in currently, as well as progress as able. Nicotine and alcohol use were reviewed and discussed as appropriate in relation to cessation and abstinence as indiciated, time spent for smoking cessation counseling when appropriate is 3-10 minutes. Appropriate use of opioid medications if prescribed as well as adherance and compliance to routine screening and avoidance of any medication that causes side effects in combination such as benzodiazepines. OARRS reviewed when indicated and naloxone offered if opioid medication prescribed.    All questions and concerns were addressed during clinical visit. Patient verbalized understanding and agreement with the current plan and counselling. The patient was invited to contact us back anytime with any questions or concerns and follow-up with us in the future.           Nova Holt CMA 12/11/24 8:18 AM

## 2024-12-13 ENCOUNTER — TELEPHONE (OUTPATIENT)
Dept: PAIN MEDICINE | Facility: CLINIC | Age: 59
End: 2024-12-13
Payer: COMMERCIAL

## 2024-12-23 DIAGNOSIS — M47.812 OSTEOARTHRITIS OF CERVICAL SPINE, UNSPECIFIED SPINAL OSTEOARTHRITIS COMPLICATION STATUS: Primary | ICD-10-CM

## 2024-12-23 DIAGNOSIS — G44.86 CERVICOGENIC HEADACHE: ICD-10-CM

## 2024-12-23 RX ORDER — HYDROCODONE BITARTRATE AND ACETAMINOPHEN 7.5; 325 MG/1; MG/1
1 TABLET ORAL EVERY 4 HOURS PRN
Qty: 180 TABLET | Refills: 0 | Status: SHIPPED | OUTPATIENT
Start: 2024-12-23 | End: 2025-01-22

## 2025-01-17 ENCOUNTER — APPOINTMENT (OUTPATIENT)
Dept: PRIMARY CARE | Facility: CLINIC | Age: 60
End: 2025-01-17
Payer: COMMERCIAL

## 2025-01-17 VITALS
DIASTOLIC BLOOD PRESSURE: 82 MMHG | SYSTOLIC BLOOD PRESSURE: 130 MMHG | HEART RATE: 93 BPM | HEIGHT: 74 IN | BODY MASS INDEX: 40.43 KG/M2 | OXYGEN SATURATION: 94 % | WEIGHT: 315 LBS

## 2025-01-17 DIAGNOSIS — Z79.4 TYPE 2 DIABETES MELLITUS WITH HYPERGLYCEMIA, WITH LONG-TERM CURRENT USE OF INSULIN: ICD-10-CM

## 2025-01-17 DIAGNOSIS — E11.65 TYPE 2 DIABETES MELLITUS WITH HYPERGLYCEMIA, WITH LONG-TERM CURRENT USE OF INSULIN: ICD-10-CM

## 2025-01-17 DIAGNOSIS — G44.86 CERVICOGENIC HEADACHE: ICD-10-CM

## 2025-01-17 LAB
AMPHETAMINES UR QL SCN: ABNORMAL
BARBITURATES UR QL SCN: ABNORMAL
BENZODIAZ UR QL SCN: ABNORMAL
BZE UR QL SCN: ABNORMAL
CANNABINOIDS UR QL SCN: ABNORMAL
FENTANYL+NORFENTANYL UR QL SCN: ABNORMAL
METHADONE UR QL SCN: ABNORMAL
OPIATES UR QL SCN: ABNORMAL
OXYCODONE+OXYMORPHONE UR QL SCN: ABNORMAL
PCP UR QL SCN: ABNORMAL

## 2025-01-17 PROCEDURE — 3008F BODY MASS INDEX DOCD: CPT

## 2025-01-17 PROCEDURE — 80361 OPIATES 1 OR MORE: CPT

## 2025-01-17 PROCEDURE — 80307 DRUG TEST PRSMV CHEM ANLYZR: CPT

## 2025-01-17 PROCEDURE — 99214 OFFICE O/P EST MOD 30 MIN: CPT

## 2025-01-17 PROCEDURE — 3075F SYST BP GE 130 - 139MM HG: CPT

## 2025-01-17 PROCEDURE — 3079F DIAST BP 80-89 MM HG: CPT

## 2025-01-17 PROCEDURE — 80365 DRUG SCREENING OXYCODONE: CPT

## 2025-01-17 PROCEDURE — 1036F TOBACCO NON-USER: CPT

## 2025-01-17 RX ORDER — INSULIN GLARGINE 100 [IU]/ML
45 INJECTION, SOLUTION SUBCUTANEOUS NIGHTLY
Qty: 3 ML | Refills: 11 | Status: SHIPPED | OUTPATIENT
Start: 2025-01-17

## 2025-01-17 RX ORDER — HYDROCODONE BITARTRATE AND ACETAMINOPHEN 7.5; 325 MG/1; MG/1
1 TABLET ORAL EVERY 4 HOURS PRN
Qty: 180 TABLET | Refills: 0 | Status: SHIPPED | OUTPATIENT
Start: 2025-01-23 | End: 2025-02-22

## 2025-01-17 ASSESSMENT — ENCOUNTER SYMPTOMS
SHORTNESS OF BREATH: 0
UNEXPECTED WEIGHT CHANGE: 0
DIFFICULTY URINATING: 0
POLYPHAGIA: 0
DIARRHEA: 0
NECK PAIN: 1
MYALGIAS: 1
CONSTIPATION: 0
FREQUENCY: 0
NECK STIFFNESS: 1
BACK PAIN: 1
DIAPHORESIS: 0
PALPITATIONS: 0
ABDOMINAL PAIN: 0
WOUND: 0
WEAKNESS: 0
NAUSEA: 0
CHILLS: 0
TROUBLE SWALLOWING: 0
HEADACHES: 0
CHEST TIGHTNESS: 0
NUMBNESS: 0
POLYDIPSIA: 0
RECTAL PAIN: 0
ARTHRALGIAS: 0
FATIGUE: 0
NERVOUS/ANXIOUS: 0

## 2025-01-17 ASSESSMENT — PATIENT HEALTH QUESTIONNAIRE - PHQ9
2. FEELING DOWN, DEPRESSED OR HOPELESS: NOT AT ALL
1. LITTLE INTEREST OR PLEASURE IN DOING THINGS: NOT AT ALL
SUM OF ALL RESPONSES TO PHQ9 QUESTIONS 1 AND 2: 0

## 2025-01-17 NOTE — PROGRESS NOTES
"Subjective   Patient ID: Vince Castillo is a 59 y.o. male who presents for Med Management.    Patient presents today for medication check.    Cervical pain: Seen and treated with Norco for approximately 5 years.  Chronic pain of neck and other joints due to history is a .  He was referred to Dr. Garrett Larios for pain management.  He states that Dr. Larios told him \"he does not know why he is here\", also states Dr. Larios told him that he does not prescribe pain medication.  The patient felt that the referral was pointless and does not want to see pain management.  I will continue to manage his medication here.  CSA completed, UDS collected.    Left hip pain: Being seen by Dr. lAberto possible surgical repair.  Is delayed due to his HgbA1c being elevated.  The patient had last drawn October 2024.  States he has been noncompliant with his diet over the holidays, however he wants to reduce his A1c to below 8 so he can have surgical repair.  Will draw new BMP and HgbA1c today.  Discussed medication management with patient once results are back.    Pulmonary nodules: 7 mm on last CT.  Followed by Dr. Clark at [place].  Next CT in 1 year with Dr. Clark.       OARRS:  Manish Larios, APRN-CNP on 1/17/2025  8:12 AM  I have personally reviewed the OARRS report for Vince Castillo. I have considered the risks of abuse, dependence, addiction and diversion    Is the patient prescribed a combination of a benzodiazepine and opioid?  Yes, I feel it is clincially indicated to continue the medication and have discussed with the patient risks/benefits/alternatives.    Last Urine Drug Screen / ordered today: Yes  No results found for this or any previous visit (from the past 8760 hours).  Results are as expected.         Controlled Substance Agreement:  Date of the Last Agreement: 1/17/25  Reviewed Controlled Substance Agreement including but not limited to the benefits, risks, and alternatives to treatment with " "a Controlled Substance medication(s).    Opioids:  What is the patient's goal of therapy? Sleep, pain reduction  Is this being achieved with current treatment? Yes      I have calculated the patient's Morphine Dose Equivalent (MED):   I have considered referral to Pain Management and/or a specialist, and do not feel it is necessary at this time.    I feel that it is clinically indicated to continue this current medication regimen after consideration of alternative therapies, and other non-opioid treatment.    Opioid Risk Screening:  No data recorded    Pain Assessment:  No data recorded          Review of Systems   Constitutional:  Negative for chills, diaphoresis, fatigue and unexpected weight change.   HENT:  Negative for dental problem, tinnitus and trouble swallowing.    Eyes:  Negative for visual disturbance.   Respiratory:  Negative for chest tightness and shortness of breath.    Cardiovascular:  Negative for chest pain, palpitations and leg swelling.   Gastrointestinal:  Negative for abdominal pain, constipation, diarrhea, nausea and rectal pain.   Endocrine: Negative for polydipsia, polyphagia and polyuria.   Genitourinary:  Negative for difficulty urinating, frequency and urgency.   Musculoskeletal:  Positive for back pain, myalgias, neck pain and neck stiffness. Negative for arthralgias.   Skin:  Negative for pallor and wound.   Neurological:  Negative for syncope, weakness, numbness and headaches.   Psychiatric/Behavioral:  Negative for suicidal ideas. The patient is not nervous/anxious.        Objective   /82 (BP Location: Left arm, Patient Position: Sitting)   Pulse 93   Ht 1.867 m (6' 1.5\")   Wt (!) 151 kg (333 lb)   SpO2 94%   BMI 43.34 kg/m²     Physical Exam  Vitals and nursing note reviewed.   Constitutional:       General: He is not in acute distress.     Appearance: Normal appearance. He is obese.   HENT:      Head: Normocephalic.      Nose: Nose normal.      Mouth/Throat:      Mouth: " Mucous membranes are moist.      Pharynx: Oropharynx is clear.   Eyes:      Pupils: Pupils are equal, round, and reactive to light.   Neck:      Vascular: No carotid bruit.   Cardiovascular:      Rate and Rhythm: Normal rate and regular rhythm.      Pulses: Normal pulses.      Heart sounds: Normal heart sounds.   Pulmonary:      Effort: Pulmonary effort is normal.      Breath sounds: Normal breath sounds.   Abdominal:      General: Bowel sounds are normal.      Palpations: Abdomen is soft.   Musculoskeletal:         General: Tenderness present. No swelling, deformity or signs of injury.      Cervical back: Rigidity and tenderness present.   Skin:     General: Skin is warm and dry.      Capillary Refill: Capillary refill takes less than 2 seconds.   Neurological:      General: No focal deficit present.      Mental Status: He is alert and oriented to person, place, and time. Mental status is at baseline.   Psychiatric:         Mood and Affect: Mood normal.         Behavior: Behavior normal.         Thought Content: Thought content normal.         Judgment: Judgment normal.         Assessment/Plan   Diagnoses and all orders for this visit:  Type 2 diabetes mellitus with hyperglycemia, with long-term current use of insulin  -     insulin glargine (Lantus) 100 unit/mL (3 mL) pen; Inject 45 Units under the skin once daily at bedtime.  -     Basic Metabolic Panel; Future  -     Hemoglobin A1C; Future  Cervicogenic headache  -     HYDROcodone-acetaminophen (Norco) 7.5-325 mg tablet; Take 1 tablet by mouth every 4 hours if needed for severe pain (7 - 10). Do not fill before January 23, 2025.  -     Drug Screen, Urine With Reflex to Confirmation; Future  Other orders  -     Follow Up In Primary Care - Established

## 2025-01-22 LAB
6MAM UR CFM-MCNC: <25 NG/ML
CODEINE UR CFM-MCNC: <50 NG/ML
HYDROCODONE CTO UR CFM-MCNC: 1108 NG/ML
HYDROMORPHONE UR CFM-MCNC: 127 NG/ML
MORPHINE UR CFM-MCNC: <50 NG/ML
NORHYDROCODONE UR CFM-MCNC: 729 NG/ML
NOROXYCODONE UR CFM-MCNC: <25 NG/ML
OXYCODONE UR CFM-MCNC: <25 NG/ML
OXYMORPHONE UR CFM-MCNC: <25 NG/ML

## 2025-02-21 DIAGNOSIS — G44.86 CERVICOGENIC HEADACHE: ICD-10-CM

## 2025-02-21 RX ORDER — HYDROCODONE BITARTRATE AND ACETAMINOPHEN 7.5; 325 MG/1; MG/1
1 TABLET ORAL EVERY 4 HOURS PRN
Qty: 180 TABLET | Refills: 0 | Status: SHIPPED | OUTPATIENT
Start: 2025-02-21 | End: 2025-03-23

## 2025-03-21 DIAGNOSIS — G44.86 CERVICOGENIC HEADACHE: ICD-10-CM

## 2025-03-21 RX ORDER — HYDROCODONE BITARTRATE AND ACETAMINOPHEN 7.5; 325 MG/1; MG/1
1 TABLET ORAL EVERY 4 HOURS PRN
Qty: 180 TABLET | Refills: 0 | Status: SHIPPED | OUTPATIENT
Start: 2025-03-21 | End: 2025-04-20

## 2025-03-28 ENCOUNTER — APPOINTMENT (OUTPATIENT)
Dept: PRIMARY CARE | Facility: CLINIC | Age: 60
End: 2025-03-28
Payer: COMMERCIAL

## 2025-03-28 VITALS
SYSTOLIC BLOOD PRESSURE: 158 MMHG | HEIGHT: 74 IN | WEIGHT: 315 LBS | OXYGEN SATURATION: 95 % | DIASTOLIC BLOOD PRESSURE: 90 MMHG | BODY MASS INDEX: 40.43 KG/M2 | HEART RATE: 98 BPM

## 2025-03-28 DIAGNOSIS — L30.9 DERMATITIS: ICD-10-CM

## 2025-03-28 DIAGNOSIS — Z00.00 ROUTINE GENERAL MEDICAL EXAMINATION AT A HEALTH CARE FACILITY: Primary | ICD-10-CM

## 2025-03-28 PROCEDURE — 1036F TOBACCO NON-USER: CPT

## 2025-03-28 PROCEDURE — 3077F SYST BP >= 140 MM HG: CPT

## 2025-03-28 PROCEDURE — 3008F BODY MASS INDEX DOCD: CPT

## 2025-03-28 PROCEDURE — 3080F DIAST BP >= 90 MM HG: CPT

## 2025-03-28 PROCEDURE — 99396 PREV VISIT EST AGE 40-64: CPT

## 2025-03-28 RX ORDER — TRIAMCINOLONE ACETONIDE 5 MG/G
CREAM TOPICAL 3 TIMES DAILY
Qty: 15 G | Refills: 1 | Status: SHIPPED | OUTPATIENT
Start: 2025-03-28

## 2025-03-28 ASSESSMENT — ENCOUNTER SYMPTOMS
POLYPHAGIA: 0
NUMBNESS: 0
PALPITATIONS: 0
HEADACHES: 0
NERVOUS/ANXIOUS: 0
POLYDIPSIA: 0
TROUBLE SWALLOWING: 0
FREQUENCY: 0
WOUND: 0
SHORTNESS OF BREATH: 0
UNEXPECTED WEIGHT CHANGE: 0
ABDOMINAL PAIN: 0
NAUSEA: 0
CHEST TIGHTNESS: 0
FATIGUE: 0
DIFFICULTY URINATING: 0
CONSTIPATION: 0
CHILLS: 0
RECTAL PAIN: 0
ARTHRALGIAS: 0
WEAKNESS: 0
DIARRHEA: 0
DIAPHORESIS: 0
MYALGIAS: 0

## 2025-03-28 NOTE — PROGRESS NOTES
Subjective   Patient ID: Vince Castillo is a 59 y.o. male who presents for Annual Exam.    Patient presents today for annual physical.    Type 2 diabetes mellitus: Last A1c 11.7.  Patient uses Lantus each night.  Has tried sliding scale coverage with Humalog and did not like how it made him feel.  Was seen by Jimmy Walton in the past.  He liked the provider however did not care for insistence on use of insulin so just wants to be managed here now.  Does admit to dietary issues, eating too many starches and processed foods.  Feels that sugar is better when he eats better.  Will get repeat A1c for evaluation today and discussed possible adjustment to medications.    Dermatitis/eczema: Has been ongoing for multiple years to medial left lower extremity.  Flat macular scaled erythematous rash.  Nonpruritic, no drainage, no pain.  States he has tried antifungal creams in the past.  Will use topical triamcinolone mixed with lotion for treatment.  Reevaluate on next visit in 3 weeks.    Scheduled follow-up for CSA in 3 weeks.             Review of Systems   Constitutional:  Negative for chills, diaphoresis, fatigue and unexpected weight change.   HENT:  Negative for dental problem, tinnitus and trouble swallowing.    Eyes:  Negative for visual disturbance.   Respiratory:  Negative for chest tightness and shortness of breath.    Cardiovascular:  Negative for chest pain, palpitations and leg swelling.   Gastrointestinal:  Negative for abdominal pain, constipation, diarrhea, nausea and rectal pain.   Endocrine: Negative for polydipsia, polyphagia and polyuria.   Genitourinary:  Negative for difficulty urinating, frequency and urgency.   Musculoskeletal:  Negative for arthralgias and myalgias.   Skin:  Positive for rash (Left lower leg). Negative for pallor and wound.   Neurological:  Negative for syncope, weakness, numbness and headaches.   Psychiatric/Behavioral:  Negative for suicidal ideas. The patient is not nervous/anxious.  "       Objective   /90   Pulse 98   Ht 1.873 m (6' 1.75\")   Wt (!) 151 kg (332 lb 8 oz)   SpO2 95%   BMI 42.98 kg/m²     Physical Exam  Vitals and nursing note reviewed.   Constitutional:       General: He is not in acute distress.     Appearance: Normal appearance. He is obese.   HENT:      Head: Normocephalic.      Nose: Nose normal.      Mouth/Throat:      Mouth: Mucous membranes are moist.      Pharynx: Oropharynx is clear.   Eyes:      General: No scleral icterus.     Pupils: Pupils are equal, round, and reactive to light.   Neck:      Vascular: No carotid bruit.   Cardiovascular:      Rate and Rhythm: Normal rate and regular rhythm.      Pulses: Normal pulses.      Heart sounds: Normal heart sounds. No murmur heard.  Pulmonary:      Effort: Pulmonary effort is normal. No respiratory distress.      Breath sounds: Normal breath sounds. No stridor. No wheezing, rhonchi or rales.   Abdominal:      General: Bowel sounds are normal. There is no distension.      Palpations: Abdomen is soft.      Tenderness: There is no abdominal tenderness. There is no right CVA tenderness or left CVA tenderness.   Musculoskeletal:         General: No swelling. Normal range of motion.      Cervical back: Normal range of motion.      Right lower leg: No edema.      Left lower leg: No edema.   Skin:     General: Skin is warm and dry.      Capillary Refill: Capillary refill takes less than 2 seconds.      Findings: Rash (Left medial lower extremity) present.   Neurological:      General: No focal deficit present.      Mental Status: He is alert and oriented to person, place, and time. Mental status is at baseline.   Psychiatric:         Mood and Affect: Mood normal.         Behavior: Behavior normal.         Thought Content: Thought content normal.         Judgment: Judgment normal.         Assessment/Plan   Diagnoses and all orders for this visit:  Routine general medical examination at a health care facility  -     Basic " Metabolic Panel; Future  -     Albumin-Creatinine Ratio, Urine Random; Future  -     Hemoglobin A1C; Future  -     CBC; Future  -     Lipid Panel; Future  Dermatitis  -     triamcinolone (Kenalog) 0.5 % cream; Apply topically 3 times a day.

## 2025-04-18 ENCOUNTER — APPOINTMENT (OUTPATIENT)
Dept: PRIMARY CARE | Facility: CLINIC | Age: 60
End: 2025-04-18
Payer: COMMERCIAL

## 2025-04-18 VITALS
OXYGEN SATURATION: 96 % | DIASTOLIC BLOOD PRESSURE: 86 MMHG | BODY MASS INDEX: 42.4 KG/M2 | HEART RATE: 92 BPM | WEIGHT: 315 LBS | SYSTOLIC BLOOD PRESSURE: 136 MMHG

## 2025-04-18 DIAGNOSIS — G44.86 CERVICOGENIC HEADACHE: ICD-10-CM

## 2025-04-18 DIAGNOSIS — E11.65 TYPE 2 DIABETES MELLITUS WITH HYPERGLYCEMIA, WITH LONG-TERM CURRENT USE OF INSULIN: ICD-10-CM

## 2025-04-18 DIAGNOSIS — L30.9 DERMATITIS: ICD-10-CM

## 2025-04-18 DIAGNOSIS — Z79.4 TYPE 2 DIABETES MELLITUS WITH HYPERGLYCEMIA, WITH LONG-TERM CURRENT USE OF INSULIN: ICD-10-CM

## 2025-04-18 DIAGNOSIS — Z79.899 CONTROLLED SUBSTANCE AGREEMENT SIGNED: Primary | ICD-10-CM

## 2025-04-18 PROCEDURE — 3079F DIAST BP 80-89 MM HG: CPT

## 2025-04-18 PROCEDURE — 1036F TOBACCO NON-USER: CPT

## 2025-04-18 PROCEDURE — 3075F SYST BP GE 130 - 139MM HG: CPT

## 2025-04-18 PROCEDURE — 99214 OFFICE O/P EST MOD 30 MIN: CPT

## 2025-04-18 RX ORDER — INSULIN LISPRO 100 [IU]/ML
40 INJECTION, SOLUTION INTRAVENOUS; SUBCUTANEOUS
Qty: 45 ML | Refills: 5 | Status: SHIPPED | OUTPATIENT
Start: 2025-04-18

## 2025-04-18 RX ORDER — HYDROCODONE BITARTRATE AND ACETAMINOPHEN 7.5; 325 MG/1; MG/1
1 TABLET ORAL EVERY 4 HOURS PRN
Qty: 180 TABLET | Refills: 0 | Status: SHIPPED | OUTPATIENT
Start: 2025-04-18 | End: 2025-05-18

## 2025-04-18 RX ORDER — TRIAMCINOLONE ACETONIDE 5 MG/G
CREAM TOPICAL 3 TIMES DAILY
Qty: 15 G | Refills: 1 | Status: SHIPPED | OUTPATIENT
Start: 2025-04-18

## 2025-04-18 ASSESSMENT — ENCOUNTER SYMPTOMS
ARTHRALGIAS: 0
CHILLS: 0
HEADACHES: 0
FATIGUE: 1
WOUND: 0
SHORTNESS OF BREATH: 0
POLYDIPSIA: 0
NERVOUS/ANXIOUS: 0
TROUBLE SWALLOWING: 0
PALPITATIONS: 0
ABDOMINAL PAIN: 0
WEAKNESS: 0
DIAPHORESIS: 0
DIFFICULTY URINATING: 0
CONSTIPATION: 0
NUMBNESS: 0
CHEST TIGHTNESS: 0
UNEXPECTED WEIGHT CHANGE: 0
POLYPHAGIA: 0
FREQUENCY: 0
RECTAL PAIN: 0
MYALGIAS: 0
DIARRHEA: 0
NAUSEA: 0

## 2025-04-18 ASSESSMENT — PATIENT HEALTH QUESTIONNAIRE - PHQ9
1. LITTLE INTEREST OR PLEASURE IN DOING THINGS: NOT AT ALL
SUM OF ALL RESPONSES TO PHQ9 QUESTIONS 1 AND 2: 0
2. FEELING DOWN, DEPRESSED OR HOPELESS: NOT AT ALL

## 2025-04-18 NOTE — PROGRESS NOTES
Subjective   Patient ID: Vince Castillo is a 59 y.o. male who presents for Med Management.    Type 2 diabetes mellitus: Last A1c 11.7.  He has been doing daily checks and is averaging sugars in around 300-400 range.  In the past had seen Jimmy Walton for treatment and was told to use Humalog 3 times daily for treatment.  He did not like this treatment and wanted to manage it through us.  At that time he was averaging 200-300 in the morning.  He overall feels worse is having bodyaches and fatigue.  Denies any polys at this time.  We had a discussion about the long-term side effects of untreated blood sugar and how this could affect his life.  He is ready to go back to Jimmy and be compliant with the Humalog treatment.  Asked me to refill Humalog today, and refer to Jimmy.    Eczema: Has been treating with topical triamcinolone and is improving.  Refilling prescription for him today.  If not resolved at next appointment will refer to dermatology.  Discussed using Dr. Eloy Mack in [place].     Chronic neck pain: Treated with Norco.  CSA on file, UDS collected today.  Medications controlled pain, help with sleep.  Uses Flexeril as well.           Review of Systems   Constitutional:  Positive for fatigue. Negative for chills, diaphoresis and unexpected weight change.   HENT:  Negative for dental problem, tinnitus and trouble swallowing.    Eyes:  Negative for visual disturbance.   Respiratory:  Negative for chest tightness and shortness of breath.    Cardiovascular:  Negative for chest pain, palpitations and leg swelling.   Gastrointestinal:  Negative for abdominal pain, constipation, diarrhea, nausea and rectal pain.   Endocrine: Negative for polydipsia, polyphagia and polyuria.   Genitourinary:  Negative for difficulty urinating, frequency and urgency.   Musculoskeletal:  Negative for arthralgias and myalgias.   Skin:  Positive for rash (Left medial thigh and calf). Negative for pallor and wound.   Neurological:   Negative for syncope, weakness, numbness and headaches.   Psychiatric/Behavioral:  Negative for suicidal ideas. The patient is not nervous/anxious.        Objective   /86 (BP Location: Left arm, Patient Position: Sitting)   Pulse 92   Wt 149 kg (328 lb)   SpO2 96%   BMI 42.40 kg/m²     Physical Exam  Vitals and nursing note reviewed.   Constitutional:       General: He is not in acute distress.     Appearance: Normal appearance.   HENT:      Head: Normocephalic.      Nose: Nose normal.      Mouth/Throat:      Mouth: Mucous membranes are moist.      Pharynx: Oropharynx is clear.   Eyes:      General: No scleral icterus.     Pupils: Pupils are equal, round, and reactive to light.   Neck:      Vascular: No carotid bruit.   Cardiovascular:      Rate and Rhythm: Normal rate and regular rhythm.      Pulses: Normal pulses.      Heart sounds: Normal heart sounds. No murmur heard.  Pulmonary:      Effort: Pulmonary effort is normal. No respiratory distress.      Breath sounds: Normal breath sounds. No stridor. No wheezing, rhonchi or rales.   Abdominal:      General: Bowel sounds are normal. There is no distension.      Palpations: Abdomen is soft.      Tenderness: There is no abdominal tenderness. There is no right CVA tenderness or left CVA tenderness.   Musculoskeletal:         General: No swelling. Normal range of motion.      Cervical back: Normal range of motion.      Right lower leg: No edema.      Left lower leg: No edema.   Skin:     General: Skin is warm and dry.      Capillary Refill: Capillary refill takes less than 2 seconds.      Findings: Rash (Left medial thigh and calf) present.   Neurological:      General: No focal deficit present.      Mental Status: He is alert and oriented to person, place, and time. Mental status is at baseline.   Psychiatric:         Mood and Affect: Mood normal.         Behavior: Behavior normal.         Thought Content: Thought content normal.         Judgment: Judgment  normal.         Assessment/Plan   Diagnoses and all orders for this visit:  Controlled substance agreement signed  -     DRUG SCREEN,URINE; Future  Cervicogenic headache  -     HYDROcodone-acetaminophen (Norco) 7.5-325 mg tablet; Take 1 tablet by mouth every 4 hours if needed for severe pain (7 - 10).  Type 2 diabetes mellitus with hyperglycemia, with long-term current use of insulin  -     insulin lispro (HumaLOG) 100 unit/mL pen; Inject 40 Units under the skin 3 times daily (morning, midday, late afternoon).  -     Referral to Endocrinology; Future  -     Basic Metabolic Panel; Future  -     Hemoglobin A1C; Future  -     Albumin-Creatinine Ratio, Urine Random; Future  Dermatitis  -     triamcinolone (Kenalog) 0.5 % cream; Apply topically 3 times a day.

## 2025-04-19 DIAGNOSIS — E78.00 HYPERCHOLESTEREMIA: Primary | ICD-10-CM

## 2025-04-19 LAB
ALBUMIN/CREAT UR: 79 MG/G CREAT
AMPHETAMINES UR QL: NEGATIVE NG/ML
ANION GAP SERPL CALCULATED.4IONS-SCNC: 12 MMOL/L (CALC) (ref 7–17)
BARBITURATES UR QL: NEGATIVE NG/ML
BENZODIAZ UR QL: NEGATIVE NG/ML
BUN SERPL-MCNC: 10 MG/DL (ref 7–25)
BUN/CREAT SERPL: 16 (CALC) (ref 6–22)
BZE UR QL: NEGATIVE NG/ML
CALCIUM SERPL-MCNC: 9.3 MG/DL (ref 8.6–10.3)
CHLORIDE SERPL-SCNC: 98 MMOL/L (ref 98–110)
CHOLEST SERPL-MCNC: 348 MG/DL
CHOLEST/HDLC SERPL: 9.7 (CALC)
CO2 SERPL-SCNC: 24 MMOL/L (ref 20–32)
CREAT SERPL-MCNC: 0.62 MG/DL (ref 0.7–1.3)
CREAT UR-MCNC: 82 MG/DL (ref 20–320)
CREAT UR-MCNC: 83.4 MG/DL
EGFRCR SERPLBLD CKD-EPI 2021: 111 ML/MIN/1.73M2
ERYTHROCYTE [DISTWIDTH] IN BLOOD BY AUTOMATED COUNT: 13 % (ref 11–15)
EST. AVERAGE GLUCOSE BLD GHB EST-MCNC: 318 MG/DL
EST. AVERAGE GLUCOSE BLD GHB EST-SCNC: 17.6 MMOL/L
GLUCOSE SERPL-MCNC: 321 MG/DL (ref 65–99)
HBA1C MFR BLD: 12.7 %
HCT VFR BLD AUTO: 51.9 % (ref 38.5–50)
HDLC SERPL-MCNC: 36 MG/DL
HGB BLD-MCNC: 17.2 G/DL (ref 13.2–17.1)
LDLC SERPL CALC-MCNC: ABNORMAL MG/DL
MCH RBC QN AUTO: 28.9 PG (ref 27–33)
MCHC RBC AUTO-ENTMCNC: 33.1 G/DL (ref 32–36)
MCV RBC AUTO: 87.1 FL (ref 80–100)
METHADONE UR QL: NEGATIVE NG/ML
MICROALBUMIN UR-MCNC: 6.5 MG/DL
NONHDLC SERPL-MCNC: 312 MG/DL (CALC)
OPIATES UR QL: POSITIVE NG/ML
OXIDANTS UR QL: NEGATIVE MCG/ML
OXYCODONE UR QL: NEGATIVE NG/ML
PH UR: 5.4 [PH] (ref 4.5–9)
PLATELET # BLD AUTO: 224 THOUSAND/UL (ref 140–400)
PMV BLD REES-ECKER: 10.9 FL (ref 7.5–12.5)
POTASSIUM SERPL-SCNC: 4.2 MMOL/L (ref 3.5–5.3)
QUEST NOTES AND COMMENTS: ABNORMAL
RBC # BLD AUTO: 5.96 MILLION/UL (ref 4.2–5.8)
SODIUM SERPL-SCNC: 134 MMOL/L (ref 135–146)
THC UR QL: NEGATIVE NG/ML
TRIGL SERPL-MCNC: 1102 MG/DL
WBC # BLD AUTO: 4.8 THOUSAND/UL (ref 3.8–10.8)

## 2025-04-19 NOTE — RESULT ENCOUNTER NOTE
Can you let Vince know his A1C is still over 12, he's aware of what it was before and is set up with Endocrinology and has started taking Insulin. Let him know it's important he stays on the insulin until seeing Jimmy Walton, He also has very high cholesterol numbers which is partially due to his glucose but I would like to prescribe him a pill to help bring those down too. If he's okay with that I will send it in for him.

## 2025-04-21 RX ORDER — ROSUVASTATIN CALCIUM 40 MG/1
40 TABLET, COATED ORAL DAILY
Qty: 100 TABLET | Refills: 3 | Status: SHIPPED | OUTPATIENT
Start: 2025-04-21 | End: 2026-05-26

## 2025-05-16 ENCOUNTER — PATIENT MESSAGE (OUTPATIENT)
Dept: PRIMARY CARE | Facility: CLINIC | Age: 60
End: 2025-05-16
Payer: COMMERCIAL

## 2025-05-16 DIAGNOSIS — G44.86 CERVICOGENIC HEADACHE: ICD-10-CM

## 2025-05-16 RX ORDER — HYDROCODONE BITARTRATE AND ACETAMINOPHEN 7.5; 325 MG/1; MG/1
1 TABLET ORAL EVERY 4 HOURS PRN
Qty: 180 TABLET | Refills: 0 | Status: SHIPPED | OUTPATIENT
Start: 2025-05-16 | End: 2025-06-15

## 2025-06-16 ENCOUNTER — PATIENT MESSAGE (OUTPATIENT)
Dept: PRIMARY CARE | Facility: CLINIC | Age: 60
End: 2025-06-16
Payer: COMMERCIAL

## 2025-06-16 DIAGNOSIS — G44.86 CERVICOGENIC HEADACHE: ICD-10-CM

## 2025-06-16 RX ORDER — HYDROCODONE BITARTRATE AND ACETAMINOPHEN 7.5; 325 MG/1; MG/1
1 TABLET ORAL EVERY 4 HOURS PRN
Qty: 180 TABLET | Refills: 0 | Status: SHIPPED | OUTPATIENT
Start: 2025-06-16 | End: 2025-06-16 | Stop reason: SDUPTHER

## 2025-06-16 RX ORDER — HYDROCODONE BITARTRATE AND ACETAMINOPHEN 7.5; 325 MG/1; MG/1
1 TABLET ORAL EVERY 4 HOURS PRN
Qty: 180 TABLET | Refills: 0 | Status: SHIPPED | OUTPATIENT
Start: 2025-06-16 | End: 2025-07-16

## 2025-06-16 NOTE — PATIENT COMMUNICATION
Patient is due for a refill on his [place]. Patient is Manish's. He is current on his UDS, CSA, and appointments. Please send refill for [facility], medication is pending for 30 days. Thanks!

## 2025-06-27 ENCOUNTER — APPOINTMENT (OUTPATIENT)
Dept: PRIMARY CARE | Facility: CLINIC | Age: 60
End: 2025-06-27
Payer: COMMERCIAL

## 2025-06-27 VITALS
WEIGHT: 315 LBS | OXYGEN SATURATION: 96 % | HEART RATE: 101 BPM | HEIGHT: 74 IN | SYSTOLIC BLOOD PRESSURE: 132 MMHG | DIASTOLIC BLOOD PRESSURE: 84 MMHG | BODY MASS INDEX: 40.43 KG/M2

## 2025-06-27 DIAGNOSIS — M47.812 SPONDYLOSIS OF CERVICAL REGION WITHOUT MYELOPATHY OR RADICULOPATHY: ICD-10-CM

## 2025-06-27 DIAGNOSIS — I10 PRIMARY HYPERTENSION: ICD-10-CM

## 2025-06-27 DIAGNOSIS — Z79.899 CONTROLLED SUBSTANCE AGREEMENT SIGNED: Primary | ICD-10-CM

## 2025-06-27 DIAGNOSIS — L30.9 DERMATITIS: ICD-10-CM

## 2025-06-27 DIAGNOSIS — M16.12 PRIMARY OSTEOARTHRITIS OF LEFT HIP: ICD-10-CM

## 2025-06-27 PROCEDURE — 3079F DIAST BP 80-89 MM HG: CPT

## 2025-06-27 PROCEDURE — 99214 OFFICE O/P EST MOD 30 MIN: CPT

## 2025-06-27 PROCEDURE — 1036F TOBACCO NON-USER: CPT

## 2025-06-27 PROCEDURE — 3075F SYST BP GE 130 - 139MM HG: CPT

## 2025-06-27 PROCEDURE — 3008F BODY MASS INDEX DOCD: CPT

## 2025-06-27 RX ORDER — HYDROCODONE BITARTRATE AND ACETAMINOPHEN 5; 325 MG/1; MG/1
1 TABLET ORAL EVERY 4 HOURS PRN
Qty: 20 TABLET | Refills: 0 | Status: SHIPPED | OUTPATIENT
Start: 2025-07-07 | End: 2025-08-06

## 2025-06-27 RX ORDER — AMLODIPINE BESYLATE 5 MG/1
5 TABLET ORAL DAILY
Qty: 90 TABLET | Refills: 3 | Status: SHIPPED | OUTPATIENT
Start: 2025-06-27

## 2025-06-27 RX ORDER — SPIRONOLACTONE 50 MG/1
50 TABLET, FILM COATED ORAL DAILY
Qty: 90 TABLET | Refills: 3 | Status: SHIPPED | OUTPATIENT
Start: 2025-06-27 | End: 2026-06-27

## 2025-06-27 ASSESSMENT — ENCOUNTER SYMPTOMS
POLYPHAGIA: 0
MYALGIAS: 0
RECTAL PAIN: 0
NUMBNESS: 0
FATIGUE: 0
TROUBLE SWALLOWING: 0
UNEXPECTED WEIGHT CHANGE: 0
POLYDIPSIA: 0
HEADACHES: 0
ABDOMINAL PAIN: 0
WEAKNESS: 0
ARTHRALGIAS: 0
DIARRHEA: 0
NAUSEA: 0
PALPITATIONS: 0
DIFFICULTY URINATING: 0
CHEST TIGHTNESS: 0
DIAPHORESIS: 0
CHILLS: 0
WOUND: 0
NERVOUS/ANXIOUS: 0
CONSTIPATION: 0
SHORTNESS OF BREATH: 0
FREQUENCY: 0

## 2025-06-27 NOTE — PROGRESS NOTES
Subjective   Patient ID: Vince Castillo is a 59 y.o. male who presents for Follow-up (3 month med check UDS/) and Rash.    Patient presents today for med check.    Cervical spondylosis: Has been using Norco for 7.5/325, 6 tablets daily.  He feels medication does not make a big difference in his pain and would like to be taken down off the medication.  Worked out titration schedule starting next month we will move to 5/325 tablets 6 times daily.  Each month will reduce 1 tablet daily with the goal being to be off the medication in 6 months.    Type 2 diabetes mellitus: Has seen Jimmy Walton with endocrinology.  Micheal added an SGLT2, changed his Lantus and has on Trulicity.  The patient has lost 7 pounds in 3 months.  He has significant lower sugars averaging approximately 160 daily morning fasting.  He is eating better and try to consume more vitamins.  He states that overall he is sleeping better and feels much better.    Dermatitis: Patient has chronic dermatitis to the medial left thigh and calf.  Has tried multiple topical medications.  Would like referral to dermatology.  Will send him to Dr. Eloy Mack in [facility]    OARRS:  Manish Larios, APRN-CNP on 6/27/2025  8:57 AM  I have personally reviewed the OARRS report for Vince Castillo. I have considered the risks of abuse, dependence, addiction and diversion and I believe that it is clinically appropriate for Vince Castillo to be prescribed this medication    Is the patient prescribed a combination of a benzodiazepine and opioid?  No    Last Urine Drug Screen / ordered today: Yes  Recent Results (from the past 8760 hours)  -DRUG SCREEN,URINE:   Collection Time: 04/18/25  9:09 AM       Result                      Value             Ref Range           Amphetamines                NEGATIVE          <500 ng/mL          Barbiturates                NEGATIVE          <300 ng/mL          Benzodiazepines             NEGATIVE          <100 ng/mL          Cocaine Metabolite           NEGATIVE          <150 ng/mL          Marijuana Metabolite        NEGATIVE          <20 ng/mL           Methadone Metabolite        NEGATIVE          <100 ng/mL          Opiates                     POSITIVE (A)      <100 ng/mL          Oxycodone                   NEGATIVE          <100 ng/mL          Creatinine                  83.4              > or = 20.0 *       pH                          5.4               4.5 - 9.0           Oxidant                     NEGATIVE          <200 mcg/mL         Notes and Comments                                           -Opiate Confirmation, Urine:   Collection Time: 01/17/25  8:48 AM       Result                      Value             Ref Range           6-Acetylmorphine            <25               <25 ng/mL           Codeine                     <50               <50 ng/mL           Hydrocodone                 1,108 (H)         <25 ng/mL           Hydromorphone               127 (H)           <25 ng/mL           Morphine                    <50               <50 ng/mL           Norhydrocodone              729 (H)           <25 ng/mL           Noroxycodone                <25               <25 ng/mL           Oxycodone                   <25               <25 ng/mL           Oxymorphone                 <25               <25 ng/mL      -Drug Screen, Urine With Reflex to Confirmation:   Collection Time: 01/17/25  8:48 AM       Result                      Value             Ref Range           Amphetamine Screen, Ur*                       Presumptive *   Presumptive Negative       Barbiturate Screen, Ur*                       Presumptive *   Presumptive Negative       Benzodiazepines Screen*                       Presumptive *   Presumptive Negative       Cannabinoid Screen, Ur*                       Presumptive *   Presumptive Negative       Cocaine Metabolite Scr*                       Presumptive *   Presumptive Negative       Fentanyl Screen, Urine                         Presumptive *   Presumptive Negative       Opiate Screen, Urine                          Presumptive *   Presumptive Positive (A)       Oxycodone Screen, Urine                       Presumptive *   Presumptive Negative       PCP Screen, Urine                             Presumptive *   Presumptive Negative       Methadone Screen, Urine                       Presumptive *   Presumptive Negative  Results are as expected.           Controlled Substance Agreement:  Date of the Last Agreement: 1/17/25  Reviewed Controlled Substance Agreement including but not limited to the benefits, risks, and alternatives to treatment with a Controlled Substance medication(s).    Opioids:  What is the patient's goal of therapy? Pain Control    Is this being achieved with current treatment? No    I have calculated the patient's Morphine Dose Equivalent (MED):   I have considered referral to Pain Management and/or a specialist, and do not feel it is necessary at this time.    I feel that it is clinically indicated to continue this current medication regimen after consideration of alternative therapies, and other non-opioid treatment.           Review of Systems   Constitutional:  Negative for chills, diaphoresis, fatigue and unexpected weight change.   HENT:  Negative for dental problem, tinnitus and trouble swallowing.    Eyes:  Negative for visual disturbance.   Respiratory:  Negative for chest tightness and shortness of breath.    Cardiovascular:  Negative for chest pain, palpitations and leg swelling.   Gastrointestinal:  Negative for abdominal pain, constipation, diarrhea, nausea and rectal pain.   Endocrine: Negative for polydipsia, polyphagia and polyuria.   Genitourinary:  Negative for difficulty urinating, frequency and urgency.   Musculoskeletal:  Negative for arthralgias and myalgias.   Skin:  Positive for rash (Left leg chronic). Negative for pallor and wound.   Neurological:  Negative for syncope, weakness, numbness and  "headaches.   Psychiatric/Behavioral:  Negative for suicidal ideas. The patient is not nervous/anxious.        Objective   /84   Pulse 101   Ht 1.873 m (6' 1.75\")   Wt 148 kg (325 lb 9.6 oz)   SpO2 96%   BMI 42.09 kg/m²     Physical Exam  Vitals and nursing note reviewed.   Constitutional:       General: He is not in acute distress.     Appearance: Normal appearance.   HENT:      Head: Normocephalic.      Nose: Nose normal.      Mouth/Throat:      Mouth: Mucous membranes are moist.      Pharynx: Oropharynx is clear.   Eyes:      General: No scleral icterus.     Pupils: Pupils are equal, round, and reactive to light.   Neck:      Vascular: No carotid bruit.   Cardiovascular:      Rate and Rhythm: Normal rate.      Pulses: Normal pulses.   Pulmonary:      Effort: Pulmonary effort is normal.   Abdominal:      General: Bowel sounds are normal.      Palpations: Abdomen is soft.      Tenderness: There is no right CVA tenderness or left CVA tenderness.   Musculoskeletal:         General: No swelling. Normal range of motion.      Cervical back: Normal range of motion.      Right lower leg: No edema.      Left lower leg: No edema.   Skin:     General: Skin is warm and dry.      Capillary Refill: Capillary refill takes less than 2 seconds.   Neurological:      General: No focal deficit present.      Mental Status: He is alert and oriented to person, place, and time. Mental status is at baseline.   Psychiatric:         Mood and Affect: Mood normal.         Behavior: Behavior normal.         Thought Content: Thought content normal.         Judgment: Judgment normal.         Assessment/Plan   Diagnoses and all orders for this visit:  Controlled substance agreement signed  -     DRUG SCREEN,URINE; Future  Primary hypertension  -     amLODIPine (Norvasc) 5 mg tablet; Take 1 tablet (5 mg) by mouth once daily.  -     spironolactone (Aldactone) 50 mg tablet; Take 1 tablet (50 mg) by mouth once daily.  Dermatitis  -     " Referral to Dermatology  Primary osteoarthritis of left hip  Spondylosis of cervical region without myelopathy or radiculopathy  -     HYDROcodone-acetaminophen (Norco) 5-325 mg tablet; Take 1 tablet by mouth every 4 hours if needed for severe pain (7 - 10). Do not fill before July 7, 2025.

## 2025-06-28 LAB
AMPHETAMINES UR QL: NEGATIVE NG/ML
BARBITURATES UR QL: NEGATIVE NG/ML
BENZODIAZ UR QL: NEGATIVE NG/ML
BZE UR QL: NEGATIVE NG/ML
CREAT UR-MCNC: 46.7 MG/DL
FENTANYL UR QL SCN: NEGATIVE NG/ML
METHADONE UR QL: NEGATIVE NG/ML
OPIATES UR QL: POSITIVE NG/ML
OXIDANTS UR QL: NEGATIVE MCG/ML
OXYCODONE UR QL: NEGATIVE NG/ML
PH UR: 6 [PH] (ref 4.5–9)
QUEST NOTES AND COMMENTS: ABNORMAL
THC UR QL: NEGATIVE NG/ML

## 2025-07-18 ENCOUNTER — APPOINTMENT (OUTPATIENT)
Dept: PRIMARY CARE | Facility: CLINIC | Age: 60
End: 2025-07-18
Payer: COMMERCIAL

## 2025-08-01 ENCOUNTER — TELEPHONE (OUTPATIENT)
Dept: PRIMARY CARE | Facility: CLINIC | Age: 60
End: 2025-08-01
Payer: COMMERCIAL

## 2025-08-01 DIAGNOSIS — M47.812 SPONDYLOSIS OF CERVICAL REGION WITHOUT MYELOPATHY OR RADICULOPATHY: ICD-10-CM

## 2025-08-01 RX ORDER — HYDROCODONE BITARTRATE AND ACETAMINOPHEN 5; 325 MG/1; MG/1
1 TABLET ORAL EVERY 4 HOURS PRN
Qty: 180 TABLET | Refills: 0 | Status: SHIPPED | OUTPATIENT
Start: 2025-08-01 | End: 2025-08-31

## 2025-08-01 NOTE — TELEPHONE ENCOUNTER
I called patient about the decrease in his dose of the Norco.  He stated that going down to the 5/325 mg of the Norco made a big difference.  He wants to stay at 6 daily at least for another month if ok.

## 2025-08-01 NOTE — TELEPHONE ENCOUNTER
Before refill this medication can you contact the patient please the last meeting he had with me we reduced his dosing from 7.5/325 to 5/325.  During the meeting he stated he wanted to taper off the medication.  Does he still want to do that?  If so, instead of giving him 6 tablets daily I will give him 5 for this refill.

## 2025-08-05 ENCOUNTER — APPOINTMENT (OUTPATIENT)
Dept: PRIMARY CARE | Facility: CLINIC | Age: 60
End: 2025-08-05
Payer: COMMERCIAL

## 2025-08-05 VITALS
SYSTOLIC BLOOD PRESSURE: 126 MMHG | OXYGEN SATURATION: 95 % | WEIGHT: 315 LBS | DIASTOLIC BLOOD PRESSURE: 84 MMHG | BODY MASS INDEX: 41.88 KG/M2 | HEART RATE: 97 BPM

## 2025-08-05 DIAGNOSIS — K64.9 ACUTE HEMORRHOID: Primary | ICD-10-CM

## 2025-08-05 PROCEDURE — 3074F SYST BP LT 130 MM HG: CPT

## 2025-08-05 PROCEDURE — 99213 OFFICE O/P EST LOW 20 MIN: CPT

## 2025-08-05 PROCEDURE — 3079F DIAST BP 80-89 MM HG: CPT

## 2025-08-05 RX ORDER — INSULIN ASPART 100 [IU]/ML
INJECTION, SOLUTION INTRAVENOUS; SUBCUTANEOUS
COMMUNITY

## 2025-08-05 RX ORDER — LIDOCAINE HYDROCHLORIDE AND HYDROCORTISONE ACETATE 30; 5 MG/G; MG/G
1 CREAM RECTAL 2 TIMES DAILY PRN
Qty: 7 G | Refills: 1 | Status: SHIPPED | OUTPATIENT
Start: 2025-08-05

## 2025-08-05 RX ORDER — DULAGLUTIDE 1.5 MG/.5ML
1.5 INJECTION, SOLUTION SUBCUTANEOUS WEEKLY
COMMUNITY
Start: 2025-07-26

## 2025-08-05 ASSESSMENT — ENCOUNTER SYMPTOMS
WOUND: 0
UNEXPECTED WEIGHT CHANGE: 0
FATIGUE: 0
FREQUENCY: 0
CHILLS: 0
CONSTIPATION: 0
CHEST TIGHTNESS: 0
PALPITATIONS: 0
WEAKNESS: 0
NERVOUS/ANXIOUS: 0
NAUSEA: 0
DIARRHEA: 0
POLYDIPSIA: 0
DIAPHORESIS: 0
NUMBNESS: 0
ABDOMINAL PAIN: 0
SHORTNESS OF BREATH: 0
RECTAL PAIN: 1
TROUBLE SWALLOWING: 0
DIFFICULTY URINATING: 0
MYALGIAS: 0
HEADACHES: 0
POLYPHAGIA: 0
ARTHRALGIAS: 0

## 2025-08-05 NOTE — PROGRESS NOTES
Subjective   Patient ID: Vince Castillo is a 60 y.o. male who presents for Rectal Bleeding.    Patient presents today for evaluation of rectal bleeding    Acute hemorrhoid: Rectal exam performed, with chaperone.  Single thrombosed hemorrhoid visible.  Patient states this began 2 months ago.  He has been on GLP-1, increase straining with bowel movement.  Started noticing stains in his underwear.  A few weeks after the start he started feeling an enlarged area when wiping.  2 weeks ago he noticed blood when wiping.  Will give patient topical treatment for treatment.  If not resolved in 2 weeks we will refer him to Dr. Sippy for evaluation of possible removal.  He had a colonoscopy last year with Dr. Vu with polyp removal.  No hemorrhoids on the exam noted at that time.       Review of Systems   Constitutional:  Negative for chills, diaphoresis, fatigue and unexpected weight change.   HENT:  Negative for dental problem, tinnitus and trouble swallowing.    Eyes:  Negative for visual disturbance.   Respiratory:  Negative for chest tightness and shortness of breath.    Cardiovascular:  Negative for chest pain, palpitations and leg swelling.   Gastrointestinal:  Positive for rectal pain. Negative for abdominal pain, constipation, diarrhea and nausea.   Endocrine: Negative for polydipsia, polyphagia and polyuria.   Genitourinary:  Negative for difficulty urinating, frequency and urgency.   Musculoskeletal:  Negative for arthralgias and myalgias.   Skin:  Negative for pallor and wound.   Neurological:  Negative for syncope, weakness, numbness and headaches.   Psychiatric/Behavioral:  Negative for suicidal ideas. The patient is not nervous/anxious.        Objective   /84 (BP Location: Left arm, Patient Position: Sitting)   Pulse 97   Wt 147 kg (324 lb)   SpO2 95%   BMI 41.88 kg/m²     Physical Exam  Constitutional:       Appearance: Normal appearance.   HENT:      Head: Normocephalic.      Mouth/Throat:       Mouth: Mucous membranes are moist.      Pharynx: Oropharynx is clear.     Eyes:      Pupils: Pupils are equal, round, and reactive to light.       Cardiovascular:      Rate and Rhythm: Normal rate.   Pulmonary:      Effort: Pulmonary effort is normal.   Genitourinary:     Comments: Thrombosed external hemorrhoid    Musculoskeletal:         General: Normal range of motion.      Cervical back: Normal range of motion.     Skin:     General: Skin is warm and dry.     Neurological:      General: No focal deficit present.      Mental Status: He is alert and oriented to person, place, and time. Mental status is at baseline.     Psychiatric:         Mood and Affect: Mood normal.         Behavior: Behavior normal.         Thought Content: Thought content normal.         Judgment: Judgment normal.         Assessment/Plan   Diagnoses and all orders for this visit:  Acute hemorrhoid  -     lidocaine HCl-hydrocortison ac 3-0.5 % cream; Insert 1 Application into the rectum 2 times a day as needed (For pain and inflammation).

## 2025-08-29 DIAGNOSIS — M47.812 SPONDYLOSIS OF CERVICAL REGION WITHOUT MYELOPATHY OR RADICULOPATHY: ICD-10-CM

## 2025-08-29 RX ORDER — HYDROCODONE BITARTRATE AND ACETAMINOPHEN 5; 325 MG/1; MG/1
1 TABLET ORAL
Qty: 150 TABLET | Refills: 0 | Status: SHIPPED | OUTPATIENT
Start: 2025-08-29 | End: 2025-09-28

## 2025-10-03 ENCOUNTER — APPOINTMENT (OUTPATIENT)
Dept: PRIMARY CARE | Facility: CLINIC | Age: 60
End: 2025-10-03
Payer: COMMERCIAL